# Patient Record
Sex: FEMALE | Race: WHITE | Employment: FULL TIME | URBAN - METROPOLITAN AREA
[De-identification: names, ages, dates, MRNs, and addresses within clinical notes are randomized per-mention and may not be internally consistent; named-entity substitution may affect disease eponyms.]

---

## 2021-02-07 PROBLEM — E21.0 PRIMARY HYPERPARATHYROIDISM (HCC): Status: ACTIVE | Noted: 2021-02-07

## 2021-02-07 PROBLEM — E89.2 STATUS POST PARATHYROIDECTOMY (HCC): Status: ACTIVE | Noted: 2021-02-07

## 2021-02-07 PROBLEM — Z90.89 STATUS POST PARATHYROIDECTOMY: Status: ACTIVE | Noted: 2021-02-07

## 2021-02-07 PROBLEM — E83.51 HYPOCALCEMIA: Status: ACTIVE | Noted: 2021-02-07

## 2021-02-07 PROBLEM — M85.80 OSTEOPENIA: Status: ACTIVE | Noted: 2021-02-07

## 2021-02-07 PROBLEM — Z98.890 STATUS POST PARATHYROIDECTOMY: Status: ACTIVE | Noted: 2021-02-07

## 2021-02-08 ENCOUNTER — OFFICE VISIT (OUTPATIENT)
Dept: ENDOCRINOLOGY | Age: 61
End: 2021-02-08
Payer: COMMERCIAL

## 2021-02-08 VITALS
OXYGEN SATURATION: 100 % | HEIGHT: 68 IN | SYSTOLIC BLOOD PRESSURE: 158 MMHG | HEART RATE: 76 BPM | WEIGHT: 188 LBS | DIASTOLIC BLOOD PRESSURE: 98 MMHG | BODY MASS INDEX: 28.49 KG/M2

## 2021-02-08 DIAGNOSIS — E89.2 POSTSURGICAL HYPOPARATHYROIDISM (HCC): ICD-10-CM

## 2021-02-08 DIAGNOSIS — E04.2 MULTINODULAR GOITER: ICD-10-CM

## 2021-02-08 DIAGNOSIS — M85.80 OSTEOPENIA, UNSPECIFIED LOCATION: ICD-10-CM

## 2021-02-08 DIAGNOSIS — E21.0 PRIMARY HYPERPARATHYROIDISM (HCC): ICD-10-CM

## 2021-02-08 DIAGNOSIS — E83.51 HYPOCALCEMIA: ICD-10-CM

## 2021-02-08 DIAGNOSIS — E89.2 STATUS POST PARATHYROIDECTOMY (HCC): ICD-10-CM

## 2021-02-08 DIAGNOSIS — E66.3 OVERWEIGHT (BMI 25.0-29.9): ICD-10-CM

## 2021-02-08 LAB
A/G RATIO: 1.6 (ref 1.1–2.2)
ALBUMIN SERPL-MCNC: 4.6 G/DL (ref 3.4–5)
ALP BLD-CCNC: 71 U/L (ref 40–129)
ALT SERPL-CCNC: 19 U/L (ref 10–40)
ANION GAP SERPL CALCULATED.3IONS-SCNC: 11 MMOL/L (ref 3–16)
AST SERPL-CCNC: 20 U/L (ref 15–37)
BILIRUB SERPL-MCNC: 0.6 MG/DL (ref 0–1)
BUN BLDV-MCNC: 30 MG/DL (ref 7–20)
CALCIUM SERPL-MCNC: 9.5 MG/DL (ref 8.3–10.6)
CHLORIDE BLD-SCNC: 103 MMOL/L (ref 99–110)
CO2: 25 MMOL/L (ref 21–32)
CREAT SERPL-MCNC: 1.2 MG/DL (ref 0.6–1.2)
GFR AFRICAN AMERICAN: 55
GFR NON-AFRICAN AMERICAN: 45
GLOBULIN: 2.8 G/DL
GLUCOSE BLD-MCNC: 91 MG/DL (ref 70–99)
MAGNESIUM: 2.1 MG/DL (ref 1.8–2.4)
PHOSPHORUS: 3.6 MG/DL (ref 2.5–4.9)
POTASSIUM SERPL-SCNC: 4.3 MMOL/L (ref 3.5–5.1)
SODIUM BLD-SCNC: 139 MMOL/L (ref 136–145)
T3 FREE: 3.2 PG/ML (ref 2.3–4.2)
T4 FREE: 1.2 NG/DL (ref 0.9–1.8)
TOTAL PROTEIN: 7.4 G/DL (ref 6.4–8.2)
TSH SERPL DL<=0.05 MIU/L-ACNC: 2.27 UIU/ML (ref 0.27–4.2)
VITAMIN D 25-HYDROXY: 38.8 NG/ML

## 2021-02-08 PROCEDURE — 99205 OFFICE O/P NEW HI 60 MIN: CPT | Performed by: INTERNAL MEDICINE

## 2021-02-08 RX ORDER — MULTIVITAMIN WITH IRON
250 TABLET ORAL 2 TIMES DAILY
Qty: 60 TABLET | Refills: 0
Start: 2021-02-08 | End: 2021-05-11 | Stop reason: ALTCHOICE

## 2021-02-08 RX ORDER — MELATONIN
1000 DAILY
Qty: 90 TABLET | Refills: 1
Start: 2021-02-08 | End: 2022-06-03

## 2021-02-08 RX ORDER — CALCIUM CARBONATE 200(500)MG
1 TABLET,CHEWABLE ORAL DAILY
COMMUNITY
End: 2021-02-08

## 2021-02-08 RX ORDER — MAGNESIUM 30 MG
30 TABLET ORAL 2 TIMES DAILY
COMMUNITY
End: 2021-05-11

## 2021-02-08 RX ORDER — ATORVASTATIN CALCIUM 40 MG/1
TABLET, FILM COATED ORAL
COMMUNITY
Start: 2020-09-15

## 2021-02-08 RX ORDER — LISINOPRIL 20 MG/1
TABLET ORAL
COMMUNITY
Start: 2020-12-21 | End: 2021-09-24

## 2021-02-08 RX ORDER — LORATADINE 10 MG/1
10 TABLET ORAL DAILY
COMMUNITY

## 2021-02-08 RX ORDER — CALCITRIOL 0.5 UG/1
0.5 CAPSULE, LIQUID FILLED ORAL DAILY
COMMUNITY
Start: 2020-11-10 | End: 2021-05-11

## 2021-02-08 RX ORDER — CALCIUM CARBONATE 200(500)MG
2 TABLET,CHEWABLE ORAL 3 TIMES DAILY
Qty: 200 TABLET | Refills: 1
Start: 2021-02-08 | End: 2021-02-09 | Stop reason: SDUPTHER

## 2021-02-08 NOTE — PROGRESS NOTES
COMPARISON:  None  NOTE:  If there are questions about the content of this report, please contact [facility] radiology by calling [phone]    DXA machine: Webtab (S/N [id number]). [facility] in g/cm2 at 95% confidence: Lumbar spine NA, Total hip NA. FRAX software version: 3.08  COMPARISON machine:  None    TECHNICAL LIMITATIONS/EXCLUSIONS: None   SKELETAL SITES (REGIONS OF INTEREST): Lumbar spine (L1-4) and left hip    FINDINGS:    LUMBAR SPINE:  T-score for the lumbar spine: -1.7  BMD for the lumbar spine in g/cm2: 0.861     HIP:  T-score for the femoral neck: -1.9  BMD for the femoral neck in g/cm2: 0.634   T-score for the total hip: -1.6  BMD for the total hip in g/cm2: 0.742     FRAX:  FRAX risk factor(s): Tobacco use   Major osteoporotic fracture risk (%): 9.7%  Hip fracture risk(%):  2.0%      NOTES:  National Osteoporosis Foundation criteria for using FRAX: untreated postmenopausal women or men 48 or older with T-score between -1.0 and -2.5, no prior hip or vertebral fracture and a DXA evaluable hip. FRAX can be calculated outside of these parameters if desired. The FRAX online calculator is available at  www. jeevan. ac.uk/FRAX/    World Health Organization reference values:  (based on lowest T-score of the lumbar spine, femoral neck, total hip, or 33% radius)  Normal:  T score at or above -1.0   Osteopenia:  T score between -1.0 and -2.5  Osteoporosis:  T score at or below -2.5  Severe osteoporosis: T-score at or below -2.5 and a fragility fracture (prior or new)    Approaches to reduce osteoporosis-related fracture risk include optimizing calcium and vitamin D status and fall-prevention measures.  The National Osteoporosis Foundation treatment guidelines recommend treatment for :  -  Those with hip fracture or vertebral fracture (clinical or asymptomatic)  -  Those with T-score -2.5 or lower at total hip, femoral neck or spine by DXA, after appropriate evaluation  -  Low bone mass and 10-year probability of a hip fracture equal to or greater than 3% or a 10-year probability of a major osteoporotic fracture equal to or greater than 20% (FRAX). All treatment decisions require clinical management and consideration of individual factors including patient preferences, comorbidities, prior drug use, risk factors not captured in FRAX model (e.g.-sarcopenia, vitamin D deficiency, increased bone   turnover, interval significant decline in bone density) and possible under or over estimation of fracture risk by FRAX   Status Results Details              Result Impression     Diffuse small right thyroid nodules. No evidence of enlarged parathyroid nodule. RECOMMENDATION:   No FNA biopsy or imaging followup recommended   Result Narrative   HISTORY: Primary hyperparathyroidism  COMPARISON: None  TECHNIQUE:  Ultrasound images of the thyroid gland  NOTE:  If there are questions about the content of this report, please [place] [facility] [place] by calling [phone]    FINDINGS:    RIGHT LOBE MEASUREMENTS:  5.0 x 1.6 x 1.4 cm (normal <  5 x 1.5 x 1.5 cm)  LEFT LOBE MEASUREMENTS:  4.8 x 1.3 x 1.1 cm (normal <  5 x 1.5 x 1.5 cm)  ADJACENT MASSES OR ENLARGED LYMPH NODES:  None    THYROID GLAND:    One or more subcentimeter thyroid nodule(s) which are smoothly marginated, not taller-than-wide, and with no echogenic foci, deemed to be clinically insignificant. There is a small round solid nodule in right lobe measuring 8 mm x 5 mm x 8 mm. Lesion is   slightly hyperechoic. In the posterior aspect of the lower pole right lobe there is heterogenous nodule measuring approximately 6 cm x 5 mm x 5 mm.     NOTE: Recommendations are based on the TI-RADS 2017 [facility] ultrasound-based risk stratification system to classify thyroid nodules that may warrant biopsy or sonographic follow-up   Other Result Information   Interface, Rad Results In - 06/30/2020  5:02 PM EDT  HISTORY: Primary hyperparathyroidism COMPARISON: None  TECHNIQUE:  Ultrasound images of the thyroid gland  NOTE:  If there are questions about the content of this report, please contact [address] [place] [address] radiology by calling [phone]    FINDINGS:    RIGHT LOBE MEASUREMENTS:  5.0 x 1.6 x 1.4 cm (normal <  5 x 1.5 x 1.5 cm)  LEFT LOBE MEASUREMENTS:  4.8 x 1.3 x 1.1 cm (normal <  5 x 1.5 x 1.5 cm)  ADJACENT MASSES OR ENLARGED LYMPH NODES:  None    THYROID GLAND:    One or more subcentimeter thyroid nodule(s) which are smoothly marginated, not taller-than-wide, and with no echogenic foci, deemed to be clinically insignificant. There is a small round solid nodule in right lobe measuring 8 mm x 5 mm x 8 mm. Lesion is   slightly hyperechoic. In the posterior aspect of the lower pole right lobe there is heterogenous nodule measuring approximately 6 cm x 5 mm x 5 mm. NOTE: Recommendations are based on the TI-RADS 2017 [address] [place] [address] Radiology ultrasound-based risk stratification system to classify thyroid nodules that may warrant biopsy or sonographic follow-up    IMPRESSION    Diffuse small right thyroid nodules. No evidence of enlarged parathyroid nodule. RECOMMENDATION:   No FNA biopsy or imaging followup recommended   Status Results Details            Past Medical History:   Diagnosis Date    Hypertension      Patient Active Problem List    Diagnosis Date Noted    Postsurgical hypoparathyroidism ([address] 02/09/2021    Multinodular goiter 02/08/2021    Overweight (BMI 25.0-29.9) 02/08/2021    Primary hyperparathyroidism ([address] 02/07/2021    Status post parathyroidectomy 02/07/2021    Hypocalcemia 02/07/2021    Osteopenia 02/07/2021     History reviewed. No pertinent surgical history. History reviewed. No pertinent family history.   Social History     Socioeconomic History    Marital status: Single     Spouse name: None    Number of children: None    Years of education: None    Highest education level: None   Occupational History    None Social Needs    Financial resource strain: None    Food insecurity     Worry: None     Inability: None    Transportation needs     Medical: None     Non-medical: None   Tobacco Use    Smoking status: Current Every Day Smoker     Packs/day: 0.10    Smokeless tobacco: Never Used   Substance and Sexual Activity    Alcohol use: Yes     Comment: socially, states 7 tonight    Drug use: No    Sexual activity: None   Lifestyle    Physical activity     Days per week: None     Minutes per session: None    Stress: None   Relationships    Social connections     Talks on phone: None     Gets together: None     Attends Yazdanism service: None     Active member of club or organization: None     Attends meetings of clubs or organizations: None     Relationship status: None    Intimate partner violence     Fear of current or ex partner: None     Emotionally abused: None     Physically abused: None     Forced sexual activity: None   Other Topics Concern    None   Social History Narrative    None     Current Outpatient Medications   Medication Sig Dispense Refill    atorvastatin (LIPITOR) 40 MG tablet TAKE ONE TABLET BY MOUTH DAILY      calcitRIOL (ROCALTROL) 0.5 MCG capsule Take 0.5 mcg by mouth daily      lisinopril (PRINIVIL;ZESTRIL) 20 MG tablet       loratadine (CLARITIN) 10 MG tablet Take 10 mg by mouth daily      magnesium 30 MG tablet Take 30 mg by mouth 2 times daily      vitamin D3 (CHOLECALCIFEROL) 25 MCG (1000 UT) TABS tablet Take 1 tablet by mouth daily 90 tablet 1    Calcium Carbonate Antacid 1000 MG CHEW Take 2 tablets by mouth 3 times daily 60 tablet 0    magnesium (MAGNESIUM-OXIDE) 250 MG TABS tablet Take 1 tablet by mouth 2 times daily 60 tablet 0    lisinopril-hydrochlorothiazide (PRINZIDE;ZESTORETIC) 20-12.5 MG per tablet Take 1 tablet by mouth daily Unsure of dose      LISINOPRIL PO Take  by mouth daily. No current facility-administered medications for this visit.       Allergies Value Date    TSH 2.27 02/08/2021    FT3 3.2 02/08/2021     No results found for: LABA1C  No results found for: EAG  No results found for: CHOL  No results found for: TRIG  No results found for: HDL  No results found for: LDLCHOLESTEROL, LDLCALC  No results found for: LABVLDL, VLDL  No results found for: CHOLHDLRATIO  No results found for: Maggi Gan  Lab Results   Component Value Date    VITD25 38.8 02/08/2021        ASSESSMENT/PLAN:  1. Primary hyperparathyroidism ([address]  Uncontrolled postsurgical hypoparathyroidism  Obtain lab work, then reevaluate  - Vitamin D 25 Hydroxy; Future  - Calcium Ionized Serum; Future  - PTH, Intact; Future  - Phosphorus; Future  - Magnesium; Future  - Comprehensive Metabolic Panel; Future  - Comprehensive Metabolic Panel; Future  - Calcium Ionized Serum; Future  - PTH, Intact; Future  - Phosphorus; Future  - Magnesium; Future  - Vitamin D 25 Hydroxy; Future    2. Status post parathyroidectomy  Parathyroidectomy 9/15/2020  4 gland hyperplasia, gland reimplantation into left sternocleidomastoid muscle on 9/15/2020  - TSH without Reflex; Future  - Comprehensive Metabolic Panel; Future  - Calcium Ionized Serum; Future  - PTH, Intact; Future  - Phosphorus; Future  - Magnesium; Future  - Vitamin D 25 Hydroxy; Future    3. Hypocalcemia/postsurgical hypoparathyroidism  Uncontrolled postsurgical hypoparathyroidism  Continue current treatment, obtain lab work, then make adjustments to her regimen  - calcitRIOL (ROCALTROL) 0.5 MCG capsule; Take 0.5 mcg by mouth daily  - magnesium 30 MG tablet; Take 30 mg by mouth 2 times daily  - vitamin D3 (CHOLECALCIFEROL) 25 MCG (1000 UT) TABS tablet; Take 1 tablet by mouth daily  Dispense: 90 tablet; Refill: 1  - Calcium Carbonate Antacid 1000 MG CHEW; Take 2 tablets by mouth 3 times daily  Dispense: 60 tablet; Refill: 0  - magnesium (MAGNESIUM-OXIDE) 250 MG TABS tablet; Take 1 tablet by mouth 2 times daily  Dispense: 60 tablet;  Refill: 0  - Vitamin D 25 Hydroxy; Future  - Calcium Ionized Serum; Future  - PTH, Intact; Future  - Phosphorus; Future  - Magnesium; Future  - Comprehensive Metabolic Panel; Future  - Comprehensive Metabolic Panel; Future  - Calcium Ionized Serum; Future  - PTH, Intact; Future  - Phosphorus; Future  - Magnesium; Future  - Vitamin D 25 Hydroxy; Future    4. Osteopenia, unspecified location  Last DEXA January 2020  Monitor bone density  - T3, Free; Future  - T4, Free; Future  - TSH without Reflex; Future  - Vitamin D 25 Hydroxy; Future  - Calcium Ionized Serum; Future  - PTH, Intact; Future  - Phosphorus; Future  - Magnesium; Future  - Comprehensive Metabolic Panel; Future  - T3, Free; Future  - T4, Free; Future  - TSH without Reflex; Future  - Comprehensive Metabolic Panel; Future  - Calcium Ionized Serum; Future  - PTH, Intact; Future  - Phosphorus; Future  - Magnesium; Future  - Vitamin D 25 Hydroxy; Future    5. Multinodular goiter  Thyroid sonogram  - T3, Free; Future  - T4, Free; Future  - TSH without Reflex; Future  - T3, Free; Future  - T4, Free; Future  - TSH without Reflex; Future    6. Overweight (BMI 25.0-29. 9)  Diet, exercise      Reviewed and/or ordered clinical lab results Yes  Reviewed and/or ordered radiology tests Yes   Reviewed and/or ordered other diagnostic tests No  Discussed test results with performing physician No  Independently reviewed image, tracing, or specimen No  Made a decision to obtain old records No  Reviewed and summarized old records Yes   Calcium 1110.811.110.710.810.77.9, upper limit normal 10.4  Elevated calcium since 2016  25 hydroxy vitamin D 23.737  PTH 90.781.806.086.73  TSH 2.15  SURGICAL PATHOLOGY REPORT    Name: Mery Pond  EPI#: [id number]  Acct#: [de-identified]    Case #: [id number]    Final Pathologic Diagnosis  A.  Possible left possible superior parathyroid, biopsy:  -     Parathyroid tissue, 0.003 g.     Shital Davidson left inferior parathyroid, excision:  -     Hypercellular parathyroid tissue,0.093 g.  -     Thymic tissue. Weeks Passey right inferior parathyroid gland, excision:  -     Adipose tissue. D.  Possible right superior parathyroid, excision:  -     Hypercellular parathyroid tissue, 0.164 g.    E.  Possible right inferior parathyroid, excision:  -          Fragments of thyroid and parathyroid tissue, 0.091 g. MCS2/MCS2/9/16/2020 11:54:37  Electronically Signed Out            Muna Rosenberg MD  Specimen(s) Received  A.  Possible left possible superior parathyroid  B.  Possible left inferior parathyroid  C.  Possible right inferior parathyroid gland  D.  Possible right superior parathyroid  E.  Possible right inferior parathyroid  Obtained history from other than patient No    Sintia Ortiz was counseled regarding symptoms of primary hyperparathyroidism, postsurgical hypoparathyroidism, thyroid disease, multinodular goiter diagnosis, course and complications of disease if inadequately treated, side effects of medications, diagnosis, treatment options, and prognosis, risks, benefits, complications, and alternatives of treatment, labs, imaging and other studies and treatment targets and goals, signs and symptoms of hyperparathyroidism, treatment, side effects of medications, risk of cancer in thyroid nodules. She understands instructions and counseling. Total time I spent for this encounter 60 minutes    Return in about 2 months (around 4/8/2021) for Hypocalcemia, multinodular goiter.     Electronically signed by Annabella Olivo MD on 2/9/2021 at 12:40 AM

## 2021-02-09 PROBLEM — E89.2 POSTSURGICAL HYPOPARATHYROIDISM (HCC): Status: ACTIVE | Noted: 2021-02-09

## 2021-02-09 LAB — PARATHYROID HORMONE INTACT: <1.2 PG/ML (ref 14–72)

## 2021-02-11 LAB
CALCIUM IONIZED, CALC AT PH 7.4: 1.22 MMOL/L (ref 1.11–1.3)
CALCIUM IONIZED: 1.25 MMOL/L (ref 1.11–1.3)

## 2021-04-06 ENCOUNTER — TELEPHONE (OUTPATIENT)
Dept: ENDOCRINOLOGY | Age: 61
End: 2021-04-06

## 2021-04-06 NOTE — TELEPHONE ENCOUNTER
Please inform patient that February test results were good overall regarding calcium, vitamin D, magnesium, phosphorus. Parathyroid hormone was low, which is related to her surgery. I advise to continue same medications and supplements doses. Advised to keep an appointment in May to discuss further plan regarding follow-up and go over if she has questions.

## 2021-05-11 ENCOUNTER — OFFICE VISIT (OUTPATIENT)
Dept: ENDOCRINOLOGY | Age: 61
End: 2021-05-11
Payer: COMMERCIAL

## 2021-05-11 VITALS
BODY MASS INDEX: 28.13 KG/M2 | OXYGEN SATURATION: 97 % | SYSTOLIC BLOOD PRESSURE: 147 MMHG | TEMPERATURE: 97.8 F | RESPIRATION RATE: 14 BRPM | DIASTOLIC BLOOD PRESSURE: 93 MMHG | WEIGHT: 185.6 LBS | HEIGHT: 68 IN | HEART RATE: 73 BPM

## 2021-05-11 DIAGNOSIS — E66.3 OVERWEIGHT (BMI 25.0-29.9): ICD-10-CM

## 2021-05-11 DIAGNOSIS — M85.80 OSTEOPENIA, UNSPECIFIED LOCATION: ICD-10-CM

## 2021-05-11 DIAGNOSIS — E89.2 STATUS POST PARATHYROIDECTOMY (HCC): ICD-10-CM

## 2021-05-11 DIAGNOSIS — E83.51 HYPOCALCEMIA: ICD-10-CM

## 2021-05-11 DIAGNOSIS — E89.2 POSTSURGICAL HYPOPARATHYROIDISM (HCC): ICD-10-CM

## 2021-05-11 DIAGNOSIS — E04.2 MULTINODULAR GOITER: ICD-10-CM

## 2021-05-11 DIAGNOSIS — E21.0 PRIMARY HYPERPARATHYROIDISM (HCC): Primary | ICD-10-CM

## 2021-05-11 PROCEDURE — 99215 OFFICE O/P EST HI 40 MIN: CPT | Performed by: INTERNAL MEDICINE

## 2021-05-11 RX ORDER — CALCITRIOL 0.5 UG/1
0.5 CAPSULE, LIQUID FILLED ORAL DAILY
Qty: 30 CAPSULE | Refills: 3 | Status: SHIPPED
Start: 2021-05-11 | End: 2021-08-10 | Stop reason: DRUGHIGH

## 2021-05-11 RX ORDER — CALCIUM CARBONATE 300MG(750)
TABLET,CHEWABLE ORAL
Qty: 90 TABLET | Refills: 0
Start: 2021-05-11 | End: 2021-09-24

## 2021-05-11 NOTE — PROGRESS NOTES
10-year probability of a major osteoporotic fracture equal to or greater than 20% (FRAX). All treatment decisions require clinical management and consideration of individual factors including patient preferences, comorbidities, prior drug use, risk factors not captured in FRAX model (e.g.-sarcopenia, vitamin D deficiency, increased bone   turnover, interval significant decline in bone density) and possible under or over estimation of fracture risk by FRAX   Status Results Details              Result Impression     Diffuse small right thyroid nodules. No evidence of enlarged parathyroid nodule. RECOMMENDATION:   No FNA biopsy or imaging followup recommended   Result Narrative   HISTORY: Primary hyperparathyroidism  COMPARISON: None  TECHNIQUE:  Ultrasound images of the thyroid gland  NOTE:  If there are questions about the content of this report, please contact [address] [place] [address] by calling [phone]    FINDINGS:    RIGHT LOBE MEASUREMENTS:  5.0 x 1.6 x 1.4 cm (normal <  5 x 1.5 x 1.5 cm)  LEFT LOBE MEASUREMENTS:  4.8 x 1.3 x 1.1 cm (normal <  5 x 1.5 x 1.5 cm)  ADJACENT MASSES OR ENLARGED LYMPH NODES:  None    THYROID GLAND:    One or more subcentimeter thyroid nodule(s) which are smoothly marginated, not taller-than-wide, and with no echogenic foci, deemed to be clinically insignificant. There is a small round solid nodule in right lobe measuring 8 mm x 5 mm x 8 mm. Lesion is   slightly hyperechoic. In the posterior aspect of the lower pole right lobe there is heterogenous nodule measuring approximately 6 cm x 5 mm x 5 mm.     NOTE: Recommendations are based on the TI-RADS 2017 [address] [place] [address] of Radiology ultrasound-based risk stratification system to classify thyroid nodules that may warrant biopsy or sonographic follow-up   Other Result Information   Interface, Rad Results In - 06/30/2020  5:02 PM EDT  HISTORY: Primary hyperparathyroidism  COMPARISON: None  TECHNIQUE:  Ultrasound images of the thyroid gland  NOTE:  If there are questions about the content of this report, please contact [address] [place] [address] radiology by calling [phone]    FINDINGS:    RIGHT LOBE MEASUREMENTS:  5.0 x 1.6 x 1.4 cm (normal <  5 x 1.5 x 1.5 cm)  LEFT LOBE MEASUREMENTS:  4.8 x 1.3 x 1.1 cm (normal <  5 x 1.5 x 1.5 cm)  ADJACENT MASSES OR ENLARGED LYMPH NODES:  None    THYROID GLAND:    One or more subcentimeter thyroid nodule(s) which are smoothly marginated, not taller-than-wide, and with no echogenic foci, deemed to be clinically insignificant. There is a small round solid nodule in right lobe measuring 8 mm x 5 mm x 8 mm. Lesion is   slightly hyperechoic. In the posterior aspect of the lower pole right lobe there is heterogenous nodule measuring approximately 6 cm x 5 mm x 5 mm. NOTE: Recommendations are based on the TI-RADS 2017 [address] [place] [address] Radiology ultrasound-based risk stratification system to classify thyroid nodules that may warrant biopsy or sonographic follow-up    IMPRESSION    Diffuse small right thyroid nodules. No evidence of enlarged parathyroid nodule. RECOMMENDATION:   No FNA biopsy or imaging followup recommended   Status Results Details            Past Medical History:   Diagnosis Date    Hypertension      Patient Active Problem List    Diagnosis Date Noted    Postsurgical hypoparathyroidism ([address] 02/09/2021    Multinodular goiter 02/08/2021    Overweight (BMI 25.0-29.9) 02/08/2021    Primary hyperparathyroidism ([address] 02/07/2021    Status post parathyroidectomy 02/07/2021    Hypocalcemia 02/07/2021    Osteopenia 02/07/2021     History reviewed. No pertinent surgical history. History reviewed. No pertinent family history.   Social History     Socioeconomic History    Marital status: Single     Spouse name: None    Number of children: None    Years of education: None    Highest education level: None   Occupational History    None   Tobacco Use    Smoking status: Current Every Day Smoker Packs/day: 0.25     Years: 15.00     Pack years: 3.75     Types: Cigarettes    Smokeless tobacco: Never Used   Substance and Sexual Activity    Alcohol use: Yes     Comment: socially, states 7 tonight    Drug use: No    Sexual activity: None   Other Topics Concern    None   Social History Narrative    None     Social Determinants of Health     Financial Resource Strain:     Difficulty of Paying Living Expenses:    Food Insecurity:     Worried About Running Out of Food in the Last Year:     Ran Out of Food in the Last Year:    Transportation Needs:     Lack of Transportation (Medical):  Lack of Transportation (Non-Medical):    Physical Activity:     Days of Exercise per Week:     Minutes of Exercise per Session:    Stress:     Feeling of Stress :    Social Connections:     Frequency of Communication with Friends and Family:     Frequency of Social Gatherings with Friends and Family:     Attends [employer] Services:     Active Member of Clubs or Organizations:     Attends Club or Organization Meetings:     Marital Status:    Intimate Partner Violence:     Fear of Current or Ex-Partner:     Emotionally Abused:     Physically Abused:     Sexually Abused:      Current Outpatient Medications   Medication Sig Dispense Refill    Magnesium 400 MG TABS 1 tablet daily 90 tablet 0    calcitRIOL (ROCALTROL) 0.5 MCG capsule Take 1 capsule by mouth daily 30 capsule 3    atorvastatin (LIPITOR) 40 MG tablet TAKE ONE TABLET BY MOUTH DAILY      lisinopril (PRINIVIL;ZESTRIL) 20 MG tablet       loratadine (CLARITIN) 10 MG tablet Take 10 mg by mouth daily      vitamin D3 (CHOLECALCIFEROL) 25 MCG (1000 UT) TABS tablet Take 1 tablet by mouth daily 90 tablet 1    Calcium Carbonate Antacid 1000 MG CHEW Take 2 tablets by mouth 3 times daily 60 tablet 0     No current facility-administered medications for this visit.      Allergies   Allergen Reactions    Methylene Blue Other (See Comments)     Pain in extremity of infusion \"throbbing, and it feels like a tourniquet is on my arm\"    Pcn [Penicillins] Hives     No family status information on file.        Review of Systems:  Constitutional: no fatigue, no fever, no recent weight gain, no recent weight loss, no changes in appetite  Eyes: no eye pain, has change in vision, no eye redness, no eye irritation, no double vision  Ears, nose, throat: has nasal congestion, no sore throat, no earache, no decrease in hearing, no hoarseness, no dry mouth, has sinus problems, no difficulty swallowing, no neck lumps, no dental problems, no mouth sores, no ringing in ears  Pulmonary: no shortness of breath, no wheezing, no dyspnea on exertion, no cough  Cardiovascular: no chest pain, no lower extremity edema, no orthopnea, no intermittent leg claudication, has palpitations  Gastrointestinal: no abdominal pain, no nausea, no vomiting, no diarrhea, has constipation, no dysphagia, has heartburn, no bloating  Genitourinary: no dysuria, no urinary incontinence, no urinary hesitancy, no urinary frequency, no feelings of urinary urgency, no nocturia  Musculoskeletal: no joint swelling, no joint stiffness, has joint pain, has muscle cramps, has muscle pain, no bone pain  Integument/Breast: no hair loss, no skin rashes, no skin lesions, no itching, no dry skin  Neurological: has numbness, has tingling, no weakness, no confusion, has headaches, has dizziness, no fainting, no tremors, no decrease in memory, no balance problems  Psychiatric: no anxiety, no depression, no insomnia  Hematologic/Lymphatic: no tendency for easy bleeding, no swollen lymph nodes, no tendency for easy bruising  Immunology: has seasonal allergies, no frequent infections, no frequent illnesses  Endocrine: no temperature intolerance    BP (!) 147/93   Pulse 73   Temp 97.8 °F (36.6 °C)   Resp 14   Ht 5' 8\" (1.727 m)   Wt 185 lb 9.6 oz (84.2 kg)   LMP 07/07/2012   SpO2 97%   BMI 28.22 kg/m²    Wt Readings from Last 3 Encounters:   05/11/21 185 lb 9.6 oz (84.2 kg)   02/08/21 188 lb (85.3 kg)     Body mass index is 28.22 kg/m².     OBJECTIVE:  Constitutional: no acute distress, well appearing and well nourished  Psychiatric: oriented to person, place and time, judgement and insight and normal, recent and remote memory and intact and mood and affect are normal  Skin: skin and subcutaneous tissue is normal without mass, normal turgor  Head and Face: examination of head and face revealed no abnormalities  Eyes: no lid or conjunctival swelling, erythema or discharge, pupils are normal, equal, round, reactive to light  Ears/Nose: external inspection of ears and nose revealed no abnormalities, hearing is grossly normal  Oropharynx/Mouth/Face: lips, tongue and gums are normal with no lesions, the voice quality was normal  Neck: neck is supple and symmetric, with midline trachea and no masses, thyroid is enlarged  Lymphatics: normal cervical lymph nodes, normal supraclavicular nodes  Pulmonary: no increased work of breathing or signs of respiratory distress, lungs are clear to auscultation  Cardiovascular: normal heart rate and rhythm, normal S1 and S2, no murmurs and pedal pulses and 2+ bilaterally, No edema  Abdomen: abdomen is soft, non-tender with no masses  Musculoskeletal: normal gait and station and exam of the digits and nails are normal  Neurological: normal coordination and normal general cortical function      Lab Review:    Lab Results   Component Value Date    WBC 5.4 09/21/2015    HGB 12.6 09/21/2015    HCT 37.6 09/21/2015    MCV 92.9 09/21/2015     09/21/2015     Lab Results   Component Value Date     02/08/2021    K 4.3 02/08/2021     02/08/2021    CO2 25 02/08/2021    BUN 30 02/08/2021    CREATININE 1.2 02/08/2021    GLUCOSE 91 02/08/2021    CALCIUM 9.5 02/08/2021    PROT 7.4 02/08/2021    LABALBU 4.6 02/08/2021    BILITOT 0.6 02/08/2021    ALKPHOS 71 02/08/2021    AST 20 02/08/2021    ALT 19 the low bone density range, meeting WHO criteria for osteopenia. INTERVAL CHANGE: Not applicable   COMPARISON:  None   DXA machine: [employer] W (S/N [id number]). [address] in g/cm2 at 95% confidence: Lumbar spine NA, Total hip NA. FRAX software version: 3.08   LUMBAR SPINE:   T-score for the lumbar spine: -1.7   BMD for the lumbar spine in g/cm2: 0.861   HIP:   T-score for the femoral neck: -1.9   BMD for the femoral neck in g/cm2: 0.634   T-score for the total hip: -1.6   BMD for the total hip in g/cm2: 0.742     FRAX:   FRAX risk factor(s): Tobacco use   Major osteoporotic fracture risk (%): 9.7%   Hip fracture risk(%):  2.0%        - Comprehensive Metabolic Panel; Future  - Vitamin D 25 Hydroxy; Future    5. Multinodular goiter  Small thyroid nodules  Continue monitoring  TSH 2.27  Thyroid sonogram  - T3, Free; Future  - T4, Free; Future  - TSH without Reflex; Future    6. Overweight (BMI 25.0-29. 9)  Diet, exercise    Decreased GFR  Feet cramps  See PCP    Reviewed and/or ordered clinical lab results Yes  Reviewed and/or ordered radiology tests Yes   Reviewed and/or ordered other diagnostic tests No  Discussed test results with performing physician No  Independently reviewed image, tracing, or specimen No  Made a decision to obtain old records No  Reviewed and summarized old records Yes   Calcium 1110.811.110.710.810.77.9, upper limit normal 10.4  Elevated calcium since 2016  25 hydroxy vitamin D 23.737  PTH 90.781.806.086.73  TSH 2.15  SURGICAL PATHOLOGY REPORT    Name: Mady Rodarte  EPI#: [id number]  Acct#: [de-identified]    Case #: [id number]    Final Pathologic Diagnosis  A.  Possible left possible superior parathyroid, biopsy:  -     Parathyroid tissue, 0.003 g. Lewiscpari Rosannerafael left inferior parathyroid, excision:  -     Hypercellular parathyroid tissue,0.093 g.  -     Thymic tissue. Corin Wilks right inferior parathyroid gland, excision:  -     Adipose tissue.     D.  Possible right superior

## 2021-08-10 ENCOUNTER — OFFICE VISIT (OUTPATIENT)
Dept: ENDOCRINOLOGY | Age: 61
End: 2021-08-10
Payer: COMMERCIAL

## 2021-08-10 VITALS
WEIGHT: 180.6 LBS | OXYGEN SATURATION: 97 % | HEART RATE: 71 BPM | SYSTOLIC BLOOD PRESSURE: 132 MMHG | HEIGHT: 67 IN | BODY MASS INDEX: 28.35 KG/M2 | DIASTOLIC BLOOD PRESSURE: 82 MMHG

## 2021-08-10 DIAGNOSIS — E83.51 HYPOCALCEMIA: ICD-10-CM

## 2021-08-10 DIAGNOSIS — M85.80 OSTEOPENIA, UNSPECIFIED LOCATION: ICD-10-CM

## 2021-08-10 DIAGNOSIS — E21.0 PRIMARY HYPERPARATHYROIDISM (HCC): Primary | ICD-10-CM

## 2021-08-10 DIAGNOSIS — E66.3 OVERWEIGHT (BMI 25.0-29.9): ICD-10-CM

## 2021-08-10 DIAGNOSIS — E89.2 STATUS POST PARATHYROIDECTOMY (HCC): ICD-10-CM

## 2021-08-10 DIAGNOSIS — E04.2 MULTINODULAR GOITER: ICD-10-CM

## 2021-08-10 DIAGNOSIS — E89.2 POSTSURGICAL HYPOPARATHYROIDISM (HCC): ICD-10-CM

## 2021-08-10 DIAGNOSIS — N18.31 STAGE 3A CHRONIC KIDNEY DISEASE (HCC): ICD-10-CM

## 2021-08-10 PROCEDURE — 99215 OFFICE O/P EST HI 40 MIN: CPT | Performed by: INTERNAL MEDICINE

## 2021-08-10 RX ORDER — CALCITRIOL 0.25 UG/1
0.25 CAPSULE, LIQUID FILLED ORAL DAILY
Qty: 30 CAPSULE | Refills: 3 | Status: SHIPPED | OUTPATIENT
Start: 2021-08-10 | End: 2021-12-09

## 2021-08-10 NOTE — PROGRESS NOTES
osteoporosis  COMPARISON:  None  NOTE:  If there are questions about the content of this report, please contact [address] [place] [address] radiology by calling [phone]    DXA machine: [employer] (S/N [id number]). 100 [facility] in g/cm2 at 95% confidence: Lumbar spine NA, Total hip NA. FRAX software version: 3.08  COMPARISON machine:  None    TECHNICAL LIMITATIONS/EXCLUSIONS: None   SKELETAL SITES (REGIONS OF INTEREST): Lumbar spine (L1-4) and left hip    FINDINGS:    LUMBAR SPINE:  T-score for the lumbar spine: -1.7  BMD for the lumbar spine in g/cm2: 0.861     HIP:  T-score for the femoral neck: -1.9  BMD for the femoral neck in g/cm2: 0.634   T-score for the total hip: -1.6  BMD for the total hip in g/cm2: 0.742     FRAX:  FRAX risk factor(s): Tobacco use   Major osteoporotic fracture risk (%): 9.7%  Hip fracture risk(%):  2.0%      NOTES:  National Osteoporosis Foundation criteria for using FRAX: untreated postmenopausal women or men 48 or older with T-score between -1.0 and -2.5, no prior hip or vertebral fracture and a DXA evaluable hip. FRAX can be calculated outside of these parameters if desired. The FRAX online calculator is available at  [email or web address] jeevan. ac.uk/FRAX/    World Health Organization reference values:  (based on lowest T-score of the lumbar spine, femoral neck, total hip, or 33% radius)  Normal:  T score at or above -1.0   Osteopenia:  T score between -1.0 and -2.5  Osteoporosis:  T score at or below -2.5  Severe osteoporosis: T-score at or below -2.5 and a fragility fracture (prior or new)    Approaches to reduce osteoporosis-related fracture risk include optimizing calcium and vitamin D status and fall-prevention measures.  The National Osteoporosis Foundation treatment guidelines recommend treatment for :  -  Those with hip fracture or vertebral fracture (clinical or asymptomatic)  -  Those with T-score -2.5 or lower at total hip, femoral neck or spine by DXA, after appropriate evaluation  -  Low bone mass and 10-year probability of a hip fracture equal to or greater than 3% or a 10-year probability of a major osteoporotic fracture equal to or greater than 20% (FRAX). All treatment decisions require clinical management and consideration of individual factors including patient preferences, comorbidities, prior drug use, risk factors not captured in FRAX model (e.g.-sarcopenia, vitamin D deficiency, increased bone   turnover, interval significant decline in bone density) and possible under or over estimation of fracture risk by FRAX   Status Results Details              Result Impression     Diffuse small right thyroid nodules. No evidence of enlarged parathyroid nodule. RECOMMENDATION:   No FNA biopsy or imaging followup recommended   Result Narrative   HISTORY: Primary hyperparathyroidism  COMPARISON: None  TECHNIQUE:  Ultrasound images of the thyroid gland  NOTE:  If there are questions about the content of this report, please contact [address] [place] [address] by calling [phone]    FINDINGS:    RIGHT LOBE MEASUREMENTS:  5.0 x 1.6 x 1.4 cm (normal <  5 x 1.5 x 1.5 cm)  LEFT LOBE MEASUREMENTS:  4.8 x 1.3 x 1.1 cm (normal <  5 x 1.5 x 1.5 cm)  ADJACENT MASSES OR ENLARGED LYMPH NODES:  None    THYROID GLAND:    One or more subcentimeter thyroid nodule(s) which are smoothly marginated, not taller-than-wide, and with no echogenic foci, deemed to be clinically insignificant. There is a small round solid nodule in right lobe measuring 8 mm x 5 mm x 8 mm. Lesion is   slightly hyperechoic. In the posterior aspect of the lower pole right lobe there is heterogenous nodule measuring approximately 6 cm x 5 mm x 5 mm.     NOTE: Recommendations are based on the TI-RADS 2017 [address] [place] [address] of Radiology ultrasound-based risk stratification system to classify thyroid nodules that may warrant biopsy or sonographic follow-up   Other Result Information   Interface, Rad Results In - 06/30/2020  5:02 PM EDT  HISTORY: Primary hyperparathyroidism  COMPARISON: None  TECHNIQUE:  Ultrasound images of the thyroid gland  NOTE:  If there are questions about the content of this report, please contact [facility] radiology by calling [phone]    FINDINGS:    RIGHT LOBE MEASUREMENTS:  5.0 x 1.6 x 1.4 cm (normal <  5 x 1.5 x 1.5 cm)  LEFT LOBE MEASUREMENTS:  4.8 x 1.3 x 1.1 cm (normal <  5 x 1.5 x 1.5 cm)  ADJACENT MASSES OR ENLARGED LYMPH NODES:  None    THYROID GLAND:    One or more subcentimeter thyroid nodule(s) which are smoothly marginated, not taller-than-wide, and with no echogenic foci, deemed to be clinically insignificant. There is a small round solid nodule in right lobe measuring 8 mm x 5 mm x 8 mm. Lesion is   slightly hyperechoic. In the posterior aspect of the lower pole right lobe there is heterogenous nodule measuring approximately 6 cm x 5 mm x 5 mm. NOTE: Recommendations are based on the TI-RADS 2017 [employer] of Radiology ultrasound-based risk stratification system to classify thyroid nodules that may warrant biopsy or sonographic follow-up    IMPRESSION    Diffuse small right thyroid nodules. No evidence of enlarged parathyroid nodule. RECOMMENDATION:   No FNA biopsy or imaging followup recommended   Status Results Details            Past Medical History:   Diagnosis Date    Hypertension      Patient Active Problem List    Diagnosis Date Noted    Postsurgical hypoparathyroidism ([address] 02/09/2021    Multinodular goiter 02/08/2021    Overweight (BMI 25.0-29.9) 02/08/2021    Primary hyperparathyroidism ([address] 02/07/2021    Status post parathyroidectomy ([address] 02/07/2021    Hypocalcemia 02/07/2021    Osteopenia 02/07/2021     History reviewed. No pertinent surgical history. History reviewed. No pertinent family history.   Social History     Socioeconomic History    Marital status: Single     Spouse name: None    Number of children: None    Years of education: None    Highest education level: None Occupational History    None   Tobacco Use    Smoking status: Current Every Day Smoker     Packs/day: 1.00     Years: 15.00     Pack years: 15.00     Types: Cigarettes    Smokeless tobacco: Never Used    Tobacco comment: some days smoking up to 2 packs    Vaping Use    Vaping Use: Never used   Substance and Sexual Activity    Alcohol use: Yes     Comment: socially    Drug use: Not Currently     Types: Marijuana    Sexual activity: None   Other Topics Concern    None   Social History Narrative    None     Social Determinants of Health     Financial Resource Strain:     Difficulty of Paying Living Expenses:    Food Insecurity:     Worried About Running Out of Food in the Last Year:     Ran Out of Food in the Last Year:    Transportation Needs:     Lack of Transportation (Medical):      Lack of Transportation (Non-Medical):    Physical Activity:     Days of Exercise per Week:     Minutes of Exercise per Session:    Stress:     Feeling of Stress :    Social Connections:     Frequency of Communication with Friends and Family:     Frequency of Social Gatherings with Friends and Family:     Attends L[employer] Services:     Active Member of Clubs or Organizations:     Attends Club or Organization Meetings:     Marital Status:    Intimate Partner Violence:     Fear of Current or Ex-Partner:     Emotionally Abused:     Physically Abused:     Sexually Abused:      Current Outpatient Medications   Medication Sig Dispense Refill    calcitRIOL (ROCALTROL) 0.25 MCG capsule Take 1 capsule by mouth daily 30 capsule 3    Magnesium 400 MG TABS 1 tablet daily 90 tablet 0    atorvastatin (LIPITOR) 40 MG tablet TAKE ONE TABLET BY MOUTH DAILY      lisinopril (PRINIVIL;ZESTRIL) 20 MG tablet       loratadine (CLARITIN) 10 MG tablet Take 10 mg by mouth daily      vitamin D3 (CHOLECALCIFEROL) 25 MCG (1000 UT) TABS tablet Take 1 tablet by mouth daily 90 tablet 1    Calcium Carbonate Antacid 1000 MG CHEW Take intolerance    /82 (Site: Right Upper Arm, Position: Sitting, Cuff Size: Large Adult)   Pulse 71   Ht 5' 7\" (1.702 m)   Wt 180 lb 9.6 oz (81.9 kg)   LMP 07/07/2012   SpO2 97%   BMI 28.29 kg/m²    Wt Readings from Last 3 Encounters:   08/10/21 180 lb 9.6 oz (81.9 kg)   05/11/21 185 lb 9.6 oz (84.2 kg)   02/08/21 188 lb (85.3 kg)     Body mass index is 28.29 kg/m².     OBJECTIVE:  Constitutional: no acute distress, well appearing and well nourished  Psychiatric: oriented to person, place and time, judgement and insight and normal, recent and remote memory and intact and mood and affect are normal  Skin: skin and subcutaneous tissue is normal without mass, normal turgor  Head and Face: examination of head and face revealed no abnormalities  Eyes: no lid or conjunctival swelling, erythema or discharge, pupils are normal, equal, round, reactive to light  Ears/Nose: external inspection of ears and nose revealed no abnormalities, hearing is grossly normal  Oropharynx/Mouth/Face: lips, tongue and gums are normal with no lesions, the voice quality was normal  Neck: neck is supple and symmetric, with midline trachea and no masses, thyroid is enlarged  Lymphatics: normal cervical lymph nodes, normal supraclavicular nodes  Pulmonary: no increased work of breathing or signs of respiratory distress, lungs are clear to auscultation  Cardiovascular: normal heart rate and rhythm, normal S1 and S2, no murmurs and pedal pulses and 2+ bilaterally, No edema  Abdomen: abdomen is soft, non-tender with no masses  Musculoskeletal: normal gait and station and exam of the digits and nails are normal  Neurological: normal coordination and normal general cortical function      Lab Review:    Lab Results   Component Value Date    WBC 5.4 09/21/2015    HGB 12.6 09/21/2015    HCT 37.6 09/21/2015    MCV 92.9 09/21/2015     09/21/2015     Lab Results   Component Value Date     08/06/2021    K 4.3 08/06/2021     08/06/2021    CO2 26 08/06/2021    BUN 29 08/06/2021    CREATININE 1.35 08/06/2021    GLUCOSE 101 08/06/2021    CALCIUM 11.0 08/06/2021    PROT 7.5 08/06/2021    LABALBU 4.8 08/06/2021    BILITOT 0.6 08/06/2021    ALKPHOS 76 08/06/2021    AST 23 08/06/2021    ALT 19 08/06/2021    LABGLOM 42 08/06/2021    GFRAA 49 08/06/2021    AGRATIO 1.8 08/06/2021    GLOB 2.7 08/06/2021     Lab Results   Component Value Date    TSH 1.910 08/06/2021    FT3 2.7 08/06/2021     No results found for: LABA1C  No results found for: EAG  No results found for: CHOL  No results found for: TRIG  No results found for: HDL  No results found for: LDLCHOLESTEROL, LDLCALC  No results found for: LABVLDL, VLDL  No results found for: CHOLHDLRATIO  No results found for: Donnice Greeley  Lab Results   Component Value Date    VITD25 37.7 08/06/2021        ASSESSMENT/PLAN:  1. Primary hyperparathyroidism ([address]  Uncontrolled postsurgical hypoparathyroidism  Parathyroidectomy 9/15/2020  - Comprehensive Metabolic Panel; Future  - Calcium Ionized Serum; Future  - PTH, Intact; Future  - Phosphorus; Future  - Magnesium; Future  - Vitamin D 25 Hydroxy; Future    2. Status post parathyroidectomy  Parathyroidectomy 9/15/2020  4 gland hyperplasia, gland reimplantation into left sternocleidomastoid muscle on 9/15/2020  - Comprehensive Metabolic Panel; Future  - Calcium Ionized Serum; Future  - PTH, Intact; Future  - Phosphorus; Future  - Magnesium; Future  - Vitamin D 25 Hydroxy; Future    3. Hypocalcemia/postsurgical hypoparathyroidism  Call with results in 2 weeks  Uncontrolled  PTH <1.27  Calcium 9.511.0  25OHvitamin D 38.837.7  Uncontrolled postsurgical hypoparathyroidism  Decrease Calcitriol to 0.25 mcg daily   Continue close monitoring  - calcitRIOL (ROCALTROL) 0.25 MCG capsule; Take 0.25 mcg by mouth daily  - magnesium 400 mg daily  - vitamin D3 (CHOLECALCIFEROL) 25 MCG (1000 UT) TABS tablet;  Take 1 tablet by mouth daily   - Calcium Carbonate Antacid 1000 MG CHEW; Take 2 tablets by mouth 3 times daily   - Vitamin D 25 Hydroxy; Future  - Calcium Ionized Serum; Future  - PTH, Intact; Future  - Phosphorus; Future  - Magnesium; Future  - Comprehensive Metabolic Panel; Future    4. Osteopenia, unspecified location  Last DEXA January 2020  Monitor bone density  OSTEOPENIA. T-scores are in the low bone density range, meeting WHO criteria for osteopenia. INTERVAL CHANGE: Not applicable   COMPARISON:  None   DXA machine: [employer] (S/N [id number]). 100 [facility] in g/cm2 at 95% confidence: Lumbar spine NA, Total hip NA. FRAX software version: 3.08   LUMBAR SPINE:   T-score for the lumbar spine: -1.7   BMD for the lumbar spine in g/cm2: 0.861   HIP:   T-score for the femoral neck: -1.9   BMD for the femoral neck in g/cm2: 0.634   T-score for the total hip: -1.6   BMD for the total hip in g/cm2: 0.742     FRAX:   FRAX risk factor(s): Tobacco use   Major osteoporotic fracture risk (%): 9.7%   Hip fracture risk(%):  2.0%        - Comprehensive Metabolic Panel; Future  - Vitamin D 25 Hydroxy; Future    5. Multinodular goiter  Small thyroid nodules  Continue monitoring  TSH 2.271.9  Thyroid sonogram  - T3, Free; Future  - T4, Free; Future  - TSH without Reflex; Future    6. Overweight (BMI 25.0-29. 9)  Diet, exercise    7.   Chronic kidney disease stage 3  Creatinine 1.35  Decreased GFR 42  Feet cramps  Refer to Nephrologist    Reviewed and/or ordered clinical lab results Yes  Reviewed and/or ordered radiology tests Yes   Reviewed and/or ordered other diagnostic tests No  Discussed test results with performing physician No  Independently reviewed image, tracing, or specimen No  Made a decision to obtain old records No  Reviewed and summarized old records Yes   Calcium 1110.811.110.710.810.77.9, upper limit normal 10.4  Elevated calcium since 2016  25 hydroxy vitamin D 23.737  PTH 90.781.806.086.73  TSH 2.15  SURGICAL PATHOLOGY REPORT    Name: Calderon Mckoy  EPI#: [id number]  [facility]#: [de-identified]    Case #: [id number]    Final Pathologic Diagnosis  A.  Possible left possible superior parathyroid, biopsy:  -     Parathyroid tissue, 0.003 g. Dukes Gross left inferior parathyroid, excision:  -     Hypercellular parathyroid tissue,0.093 g.  -     Thymic tissue. Gabriela Gross right inferior parathyroid gland, excision:  -     Adipose tissue. D.  Possible right superior parathyroid, excision:  -     Hypercellular parathyroid tissue, 0.164 g.    E.  Possible right inferior parathyroid, excision:  -          Fragments of thyroid and parathyroid tissue, 0.091 g. MCS2/MCS2/9/16/2020 11:54:37  Electronically Signed Out            Tavo Cota MD  Specimen(s) Received  A.  Possible left possible superior parathyroid  B.  Possible left inferior parathyroid  C.  Possible right inferior parathyroid gland  D.  Possible right superior parathyroid  E.  Possible right inferior parathyroid    Obtained history from other than patient Kellen Galan was counseled regarding symptoms of primary hyperparathyroidism, postsurgical hypoparathyroidism, thyroid disease, multinodular goiter diagnosis, course and complications of disease if inadequately treated, side effects of medications, diagnosis, treatment options, and prognosis, risks, benefits, complications, and alternatives of treatment, labs, imaging and other studies and treatment targets and goals, signs and symptoms of hyperparathyroidism, treatment, side effects of medications, risk of cancer in thyroid nodules. She understands instructions and counseling. Total time I spent for this encounter 40 minutes    Return in about 2 months (around 10/10/2021) for thyroid problems, hypercalcemia.     Electronically signed by Parul Boudreaux MD on 8/10/2021 at 3:29 PM

## 2021-09-02 ENCOUNTER — TELEPHONE (OUTPATIENT)
Dept: ENDOCRINOLOGY | Age: 61
End: 2021-09-02

## 2021-09-03 NOTE — TELEPHONE ENCOUNTER
I spoke with the patient. Discussed results. Will attempt to decrease calcium to 4 tablets daily, later to 3 tablets daily if no symptoms. She is seeing a kidney specialist at the end of the month, will await their lab results and discuss calcium test again.

## 2021-10-15 ENCOUNTER — HOSPITAL ENCOUNTER (OUTPATIENT)
Age: 61
Discharge: HOME | End: 2021-10-15
Payer: COMMERCIAL

## 2021-10-15 DIAGNOSIS — N17.9 AKI (ACUTE KIDNEY INJURY) (HCC): ICD-10-CM

## 2021-10-15 LAB
A/G RATIO: 1.6 (ref 1.1–2.2)
ALBUMIN SERPL-MCNC: 4.7 G/DL (ref 3.4–5)
ALP BLD-CCNC: 71 U/L (ref 40–129)
ALT SERPL-CCNC: 19 U/L (ref 10–40)
ANION GAP SERPL CALCULATED.3IONS-SCNC: 14 MMOL/L (ref 3–16)
AST SERPL-CCNC: 20 U/L (ref 15–37)
BACTERIA: ABNORMAL /HPF
BILIRUB SERPL-MCNC: 0.6 MG/DL (ref 0–1)
BILIRUBIN URINE: NEGATIVE
BLOOD, URINE: ABNORMAL
BUN BLDV-MCNC: 18 MG/DL (ref 7–20)
C3 COMPLEMENT: 136.3 MG/DL (ref 90–180)
C4 COMPLEMENT: 32.1 MG/DL (ref 10–40)
CALCIUM SERPL-MCNC: 9.7 MG/DL (ref 8.3–10.6)
CHLORIDE BLD-SCNC: 101 MMOL/L (ref 99–110)
CLARITY: ABNORMAL
CO2: 24 MMOL/L (ref 21–32)
COLOR: YELLOW
CREAT SERPL-MCNC: 0.9 MG/DL (ref 0.6–1.2)
CREATININE URINE: 132.2 MG/DL (ref 28–259)
EPITHELIAL CELLS, UA: 15 /HPF (ref 0–5)
GFR AFRICAN AMERICAN: >60
GFR NON-AFRICAN AMERICAN: >60
GLOBULIN: 3 G/DL
GLUCOSE BLD-MCNC: 106 MG/DL (ref 70–99)
GLUCOSE URINE: NEGATIVE MG/DL
HCT VFR BLD CALC: 39.6 % (ref 36–48)
HEMOGLOBIN: 13.5 G/DL (ref 12–16)
HYALINE CASTS: 5 /LPF (ref 0–8)
KETONES, URINE: NEGATIVE MG/DL
LEUKOCYTE ESTERASE, URINE: ABNORMAL
MCH RBC QN AUTO: 31.7 PG (ref 26–34)
MCHC RBC AUTO-ENTMCNC: 34.1 G/DL (ref 31–36)
MCV RBC AUTO: 93.1 FL (ref 80–100)
MICROSCOPIC EXAMINATION: YES
NITRITE, URINE: NEGATIVE
PDW BLD-RTO: 13.5 % (ref 12.4–15.4)
PH UA: 7.5 (ref 5–8)
PLATELET # BLD: 206 K/UL (ref 135–450)
PMV BLD AUTO: 9.4 FL (ref 5–10.5)
POTASSIUM SERPL-SCNC: 4 MMOL/L (ref 3.5–5.1)
PROTEIN PROTEIN: 25 MG/DL
PROTEIN UA: 30 MG/DL
RBC # BLD: 4.26 M/UL (ref 4–5.2)
RBC UA: 3 /HPF (ref 0–4)
SODIUM BLD-SCNC: 139 MMOL/L (ref 136–145)
SPECIFIC GRAVITY UA: 1.02 (ref 1–1.03)
TOTAL PROTEIN: 7.7 G/DL (ref 6.4–8.2)
URINE TYPE: ABNORMAL
UROBILINOGEN, URINE: 0.2 E.U./DL
WBC # BLD: 6.5 K/UL (ref 4–11)
WBC UA: 183 /HPF (ref 0–5)

## 2021-10-15 PROCEDURE — 82570 ASSAY OF URINE CREATININE: CPT

## 2021-10-15 PROCEDURE — 85027 COMPLETE CBC AUTOMATED: CPT

## 2021-10-15 PROCEDURE — 80053 COMPREHEN METABOLIC PANEL: CPT

## 2021-10-15 PROCEDURE — 84156 ASSAY OF PROTEIN URINE: CPT

## 2021-10-15 PROCEDURE — 86038 ANTINUCLEAR ANTIBODIES: CPT

## 2021-10-15 PROCEDURE — 81001 URINALYSIS AUTO W/SCOPE: CPT

## 2021-10-15 PROCEDURE — 86160 COMPLEMENT ANTIGEN: CPT

## 2021-10-15 PROCEDURE — 36415 COLL VENOUS BLD VENIPUNCTURE: CPT

## 2021-10-16 LAB — ANTI-NUCLEAR ANTIBODY (ANA): NEGATIVE

## 2021-12-09 RX ORDER — CALCITRIOL 0.25 UG/1
CAPSULE, LIQUID FILLED ORAL
Qty: 30 CAPSULE | Refills: 3 | Status: SHIPPED | OUTPATIENT
Start: 2021-12-09 | End: 2022-02-25 | Stop reason: SDUPTHER

## 2022-02-25 ENCOUNTER — OFFICE VISIT (OUTPATIENT)
Dept: ENDOCRINOLOGY | Age: 62
End: 2022-02-25
Payer: COMMERCIAL

## 2022-02-25 VITALS
HEIGHT: 67 IN | HEART RATE: 78 BPM | SYSTOLIC BLOOD PRESSURE: 126 MMHG | BODY MASS INDEX: 28.56 KG/M2 | OXYGEN SATURATION: 96 % | WEIGHT: 182 LBS | RESPIRATION RATE: 14 BRPM | TEMPERATURE: 98 F | DIASTOLIC BLOOD PRESSURE: 74 MMHG

## 2022-02-25 DIAGNOSIS — E83.51 HYPOCALCEMIA: ICD-10-CM

## 2022-02-25 DIAGNOSIS — E21.0 PRIMARY HYPERPARATHYROIDISM (HCC): ICD-10-CM

## 2022-02-25 DIAGNOSIS — E04.2 MULTINODULAR GOITER: ICD-10-CM

## 2022-02-25 DIAGNOSIS — M85.80 OSTEOPENIA, UNSPECIFIED LOCATION: ICD-10-CM

## 2022-02-25 DIAGNOSIS — E89.2 STATUS POST PARATHYROIDECTOMY (HCC): ICD-10-CM

## 2022-02-25 DIAGNOSIS — E66.3 OVERWEIGHT (BMI 25.0-29.9): ICD-10-CM

## 2022-02-25 DIAGNOSIS — E89.2 POSTSURGICAL HYPOPARATHYROIDISM (HCC): ICD-10-CM

## 2022-02-25 DIAGNOSIS — E21.0 PRIMARY HYPERPARATHYROIDISM (HCC): Primary | ICD-10-CM

## 2022-02-25 LAB
A/G RATIO: 2 (ref 1.1–2.2)
ALBUMIN SERPL-MCNC: 4.9 G/DL (ref 3.4–5)
ALP BLD-CCNC: 77 U/L (ref 40–129)
ALT SERPL-CCNC: 15 U/L (ref 10–40)
ANION GAP SERPL CALCULATED.3IONS-SCNC: 13 MMOL/L (ref 3–16)
AST SERPL-CCNC: 20 U/L (ref 15–37)
BILIRUB SERPL-MCNC: 0.6 MG/DL (ref 0–1)
BUN BLDV-MCNC: 25 MG/DL (ref 7–20)
CALCIUM SERPL-MCNC: 9.5 MG/DL (ref 8.3–10.6)
CHLORIDE BLD-SCNC: 101 MMOL/L (ref 99–110)
CO2: 25 MMOL/L (ref 21–32)
CREAT SERPL-MCNC: 0.8 MG/DL (ref 0.6–1.2)
GFR AFRICAN AMERICAN: >60
GFR NON-AFRICAN AMERICAN: >60
GLUCOSE BLD-MCNC: 80 MG/DL (ref 70–99)
MAGNESIUM: 2.2 MG/DL (ref 1.8–2.4)
PARATHYROID HORMONE INTACT: 6.5 PG/ML (ref 14–72)
PHOSPHORUS: 4.4 MG/DL (ref 2.5–4.9)
POTASSIUM SERPL-SCNC: 5 MMOL/L (ref 3.5–5.1)
SODIUM BLD-SCNC: 139 MMOL/L (ref 136–145)
T3 FREE: 3.3 PG/ML (ref 2.3–4.2)
T4 FREE: 1.4 NG/DL (ref 0.9–1.8)
TOTAL PROTEIN: 7.3 G/DL (ref 6.4–8.2)
TSH SERPL DL<=0.05 MIU/L-ACNC: 2.33 UIU/ML (ref 0.27–4.2)
VITAMIN D 25-HYDROXY: 50.2 NG/ML

## 2022-02-25 PROCEDURE — G8419 CALC BMI OUT NRM PARAM NOF/U: HCPCS | Performed by: INTERNAL MEDICINE

## 2022-02-25 PROCEDURE — G8427 DOCREV CUR MEDS BY ELIG CLIN: HCPCS | Performed by: INTERNAL MEDICINE

## 2022-02-25 PROCEDURE — 99214 OFFICE O/P EST MOD 30 MIN: CPT | Performed by: INTERNAL MEDICINE

## 2022-02-25 PROCEDURE — 4004F PT TOBACCO SCREEN RCVD TLK: CPT | Performed by: INTERNAL MEDICINE

## 2022-02-25 PROCEDURE — G8484 FLU IMMUNIZE NO ADMIN: HCPCS | Performed by: INTERNAL MEDICINE

## 2022-02-25 PROCEDURE — 3017F COLORECTAL CA SCREEN DOC REV: CPT | Performed by: INTERNAL MEDICINE

## 2022-02-25 RX ORDER — CALCITRIOL 0.25 UG/1
CAPSULE, LIQUID FILLED ORAL
Qty: 30 CAPSULE | Refills: 3 | Status: SHIPPED | OUTPATIENT
Start: 2022-02-25 | End: 2022-06-03 | Stop reason: SDUPTHER

## 2022-02-25 RX ORDER — MELATONIN
1000 DAILY
Qty: 90 TABLET | Refills: 1 | Status: CANCELLED | OUTPATIENT
Start: 2022-02-25

## 2022-02-25 NOTE — PROGRESS NOTES
SUBJECTIVE:  Letty Cope is a 59 y.o. female who is being evaluated for hyperparathyroidism. 1. Primary hyperparathyroidism ([address]    This started in 2016. Patient was diagnosed with hyperparathyroidism. The problem has been gradually worsening. Patient started medication in 2020. Currently patient is on: Calcitriol, vitamin D, magnesium, Tums. Misses  0 doses a month. Current complaints: No tingling, but has foot cramps  Hot flashes 1-2 times daily    2. Status post parathyroidectomy    Parathyroidectomy 9/15/2020  4 gland hyperplasia, gland reimplantation into left sternocleidomastoid muscle on 9/15/2020    3. Hypocalcemia/postsurgical hypoparathyroidism  Patient has foot cramps  Patient developed hypocalcemia after parathyroid surgery. Tums 1000 mg 2 tablets 3 times a day, calcitriol 0.5 mcg daily, magnesium 250 mg daily, vitamin D 1000 international units daily    4. Osteopenia, unspecified location  Had rib fractures  Mother had hip fracture  No steroids long term. No RA  Smoker    5. Multinodular goiter  History of obstructive symptoms: difficulty swallowing No, changes in voice/hoarseness Yes. History of radiation to patient's neck: No  Resent iodine exposure: No  Family history includes mother had neck surgery  Family history of thyroid cancer: No    6. Overweight (BMI 25.0-29. 9)  Eats healthy, active    7. Chronic kidney disease stage 3  No urination problems      OSTEOPENIA. T-scores are in the low bone density range, meeting WHO criteria for osteopenia. Potential treatment should be based on individual risk factors. It is important to exclude secondary causes of low bone density. Patients with low bone density   should have regular DXA scans. The scanning interval should be determined according to the clinical status of the patient and/or the treatment regimen.     INTERVAL CHANGE: Not applicable    Result Narrative   HISTORY: Encounter for screening for osteoporosis  Screening for osteoporosis  COMPARISON:  None  NOTE:  If there are questions about the content of this report, please [place] [facility] calling [phone]    DXA machine: Sheryle Rocker (S/N [id number]). 100 [facility] in g/cm2 at 95% confidence: Lumbar spine NA, Total hip NA. FRAX software version: 3.08  COMPARISON machine:  None    TECHNICAL LIMITATIONS/EXCLUSIONS: None   SKELETAL SITES (REGIONS OF INTEREST): Lumbar spine (L1-4) and left hip    FINDINGS:    LUMBAR SPINE:  T-score for the lumbar spine: -1.7  BMD for the lumbar spine in g/cm2: 0.861     HIP:  T-score for the femoral neck: -1.9  BMD for the femoral neck in g/cm2: 0.634   T-score for the total hip: -1.6  BMD for the total hip in g/cm2: 0.742     FRAX:  FRAX risk factor(s): Tobacco use   Major osteoporotic fracture risk (%): 9.7%  Hip fracture risk(%):  2.0%      NOTES:  National Osteoporosis Foundation criteria for using FRAX: untreated postmenopausal women or men 48 or older with T-score between -1.0 and -2.5, no prior hip or vertebral fracture and a DXA evaluable hip. FRAX can be calculated outside of these parameters if desired. The FRAX online calculator is available at  www. jeevan. ac.uk/FRAX/    World Health Organization reference values:  (based on lowest T-score of the lumbar spine, femoral neck, total hip, or 33% radius)  Normal:  T score at or above -1.0   Osteopenia:  T score between -1.0 and -2.5  Osteoporosis:  T score at or below -2.5  Severe osteoporosis: T-score at or below -2.5 and a fragility fracture (prior or new)    Approaches to reduce osteoporosis-related fracture risk include optimizing calcium and vitamin D status and fall-prevention measures.  The National Osteoporosis Foundation treatment guidelines recommend treatment for :  -  Those with hip fracture or vertebral fracture (clinical or asymptomatic)  -  Those with T-score -2.5 or lower at total hip, femoral neck or spine by DXA, after appropriate evaluation  -  Low bone mass and 10-year probability of a hip fracture equal to or greater than 3% or a 10-year probability of a major osteoporotic fracture equal to or greater than 20% (FRAX). All treatment decisions require clinical management and consideration of individual factors including patient preferences, comorbidities, prior drug use, risk factors not captured in FRAX model (e.g.-sarcopenia, vitamin D deficiency, increased bone   turnover, interval significant decline in bone density) and possible under or over estimation of fracture risk by FRAX   Status Results Details              Result Impression     Diffuse small right thyroid nodules. No evidence of enlarged parathyroid nodule. RECOMMENDATION:   No FNA biopsy or imaging followup recommended   Result Narrative   HISTORY: Primary hyperparathyroidism  COMPARISON: None  TECHNIQUE:  Ultrasound images of the thyroid gland  NOTE:  If there are questions about the content of this report, please contact [address] [place] [address] radiology by calling [phone]    FINDINGS:    RIGHT LOBE MEASUREMENTS:  5.0 x 1.6 x 1.4 cm (normal <  5 x 1.5 x 1.5 cm)  LEFT LOBE MEASUREMENTS:  4.8 x 1.3 x 1.1 cm (normal <  5 x 1.5 x 1.5 cm)  ADJACENT MASSES OR ENLARGED LYMPH NODES:  None    THYROID GLAND:    One or more subcentimeter thyroid nodule(s) which are smoothly marginated, not taller-than-wide, and with no echogenic foci, deemed to be clinically insignificant. There is a small round solid nodule in right lobe measuring 8 mm x 5 mm x 8 mm. Lesion is   slightly hyperechoic. In the posterior aspect of the lower pole right lobe there is heterogenous nodule measuring approximately 6 cm x 5 mm x 5 mm.     NOTE: Recommendations are based on the TI-RADS 2017 [employer] ultrasound-based risk stratification system to classify thyroid nodules that may warrant biopsy or sonographic follow-up   Other Result Information   Interface, Rad Results In - 06/30/2020  5:02 PM EDT  HISTORY: Primary hyperparathyroidism  COMPARISON: None  TECHNIQUE:  Ultrasound images of the thyroid gland  NOTE:  If there are questions about the content of this report, please contact [address] [place] [address] radiology by calling [phone]    FINDINGS:    RIGHT LOBE MEASUREMENTS:  5.0 x 1.6 x 1.4 cm (normal <  5 x 1.5 x 1.5 cm)  LEFT LOBE MEASUREMENTS:  4.8 x 1.3 x 1.1 cm (normal <  5 x 1.5 x 1.5 cm)  ADJACENT MASSES OR ENLARGED LYMPH NODES:  None    THYROID GLAND:    One or more subcentimeter thyroid nodule(s) which are smoothly marginated, not taller-than-wide, and with no echogenic foci, deemed to be clinically insignificant. There is a small round solid nodule in right lobe measuring 8 mm x 5 mm x 8 mm. Lesion is   slightly hyperechoic. In the posterior aspect of the lower pole right lobe there is heterogenous nodule measuring approximately 6 cm x 5 mm x 5 mm. NOTE: Recommendations are based on the TI-RADS 2017 [employer] ultrasound-based risk stratification system to classify thyroid nodules that may warrant biopsy or sonographic follow-up    IMPRESSION    Diffuse small right thyroid nodules. No evidence of enlarged parathyroid nodule. RECOMMENDATION:   No FNA biopsy or imaging followup recommended   Status Results Details            Past Medical History:   Diagnosis Date    Hypertension      Patient Active Problem List    Diagnosis Date Noted    Postsurgical hypoparathyroidism ([address] 02/09/2021    Multinodular goiter 02/08/2021    Overweight (BMI 25.0-29.9) 02/08/2021    Primary hyperparathyroidism ([address] 02/07/2021    Status post parathyroidectomy ([address] 02/07/2021    Hypocalcemia 02/07/2021    Osteopenia 02/07/2021     History reviewed. No pertinent surgical history.   Family History   Problem Relation Age of Onset    Cancer Mother      Social History     Socioeconomic History    Marital status: Single     Spouse name: None    Number of children: None    Years of education: None    Highest education level: None   Occupational History    None   Tobacco Use    Smoking status: Current Every Day Smoker     Packs/day: 1.00     Years: 15.00     Pack years: 15.00     Types: Cigarettes    Smokeless tobacco: Never Used    Tobacco comment: some days smoking up to 2 packs    Vaping Use    Vaping Use: Never used   Substance and Sexual Activity    Alcohol use: Not Currently     Comment: socially    Drug use: Not Currently     Types: Marijuana Jenny Lawler)    Sexual activity: Yes   Other Topics Concern    None   Social History Narrative    None     Social Determinants of Health     Financial Resource Strain:     Difficulty of Paying Living Expenses: Not on file   Food Insecurity:     Worried About Running Out of Food in the Last Year: Not on file    Vicki of Food in the Last Year: Not on file   Transportation Needs:     Lack of Transportation (Medical): Not on file    Lack of Transportation (Non-Medical):  Not on file   Physical Activity:     Days of Exercise per Week: Not on file    Minutes of Exercise per Session: Not on file   Stress:     Feeling of Stress : Not on file   Social Connections:     Frequency of Communication with Friends and Family: Not on file    Frequency of Social Gatherings with Friends and Family: Not on file    Attends Druze Services: Not on file    Active Member of [address] [place] [address] [employer] or Organizations: Not on file    Attends Club or Organization Meetings: Not on file    Marital Status: Not on file   Intimate Partner Violence:     Fear of Current or Ex-Partner: Not on file    Emotionally Abused: Not on file    Physically Abused: Not on file    Sexually Abused: Not on file   Housing Stability:     Unable to Pay for Housing in the Last Year: Not on file    Number of [place] in the Last Year: Not on file    Unstable Housing in the Last Year: Not on file     Current Outpatient Medications   Medication Sig Dispense Refill    calcitRIOL (ROCALTROL) 0.25 MCG capsule TAKE ONE CAPSULE BY MOUTH DAILY 30 capsule 3    amLODIPine (NORVASC) 5 MG tablet TAKE ONE TABLET BY MOUTH DAILY 30 tablet 3    lisinopril (PRINIVIL;ZESTRIL) 10 MG tablet Take 1 tablet by mouth daily 90 tablet 1    atorvastatin (LIPITOR) 40 MG tablet TAKE ONE TABLET BY MOUTH DAILY      loratadine (CLARITIN) 10 MG tablet Take 10 mg by mouth daily      vitamin D3 (CHOLECALCIFEROL) 25 MCG (1000 UT) TABS tablet Take 1 tablet by mouth daily 90 tablet 1     No current facility-administered medications for this visit.      Allergies   Allergen Reactions    Methylene Blue Other (See Comments)     Pain in extremity of infusion \"throbbing, and it feels like a tourniquet is on my arm\"    Pcn [Penicillins] Hives     Family Status   Relation Name Status    Mother  (Not Specified)       Review of Systems:  Constitutional: no fatigue, no fever, no recent weight gain, no recent weight loss, no changes in appetite  Eyes: no eye pain, has change in vision, no eye redness, no eye irritation, no double vision  Ears, nose, throat: has nasal congestion, no sore throat, no earache, no decrease in hearing, no hoarseness, no dry mouth, has sinus problems, no difficulty swallowing, no neck lumps, no dental problems, no mouth sores, no ringing in ears  Pulmonary: no shortness of breath, no wheezing, no dyspnea on exertion, no cough  Cardiovascular: no chest pain, no lower extremity edema, no orthopnea, no intermittent leg claudication, has palpitations  Gastrointestinal: no abdominal pain, no nausea, no vomiting, no diarrhea, has constipation, no dysphagia, has heartburn, no bloating  Genitourinary: no dysuria, no urinary incontinence, no urinary hesitancy, no urinary frequency, no feelings of urinary urgency, no nocturia  Musculoskeletal: no joint swelling, no joint stiffness, has joint pain, has muscle cramps, has muscle pain, no bone pain  Integument/Breast: no hair loss, no skin rashes, no skin lesions, no itching, no dry skin  Neurological: has numbness, has tingling, no weakness, no confusion, has headaches, has dizziness, no fainting, no tremors, no decrease in memory, no balance problems  Psychiatric: no anxiety, no depression, no insomnia  Hematologic/Lymphatic: no tendency for easy bleeding, no swollen lymph nodes, no tendency for easy bruising  Immunology: has seasonal allergies, no frequent infections, no frequent illnesses  Endocrine: no temperature intolerance    /74   Pulse 78   Temp 98 °F (36.7 °C)   Resp 14   Ht 5' 7\" (1.702 m)   Wt 182 lb (82.6 kg)   LMP 07/07/2012   SpO2 96%   BMI 28.51 kg/m²    Wt Readings from Last 3 Encounters:   02/25/22 182 lb (82.6 kg)   10/22/21 180 lb (81.6 kg)   09/24/21 178 lb 8 oz (81 kg)     Body mass index is 28.51 kg/m².     OBJECTIVE:  Constitutional: no acute distress, well appearing and well nourished  Psychiatric: oriented to person, place and time, judgement and insight and normal, recent and remote memory and intact and mood and affect are normal  Skin: skin and subcutaneous tissue is normal without mass, normal turgor  Head and Face: examination of head and face revealed no abnormalities  Eyes: no lid or conjunctival swelling, erythema or discharge, pupils are normal, equal, round, reactive to light  Ears/Nose: external inspection of ears and nose revealed no abnormalities, hearing is grossly normal  Oropharynx/Mouth/Face: lips, tongue and gums are normal with no lesions, the voice quality was normal  Neck: neck is supple and symmetric, with midline trachea and no masses, thyroid is enlarged  Lymphatics: normal cervical lymph nodes, normal supraclavicular nodes  Pulmonary: no increased work of breathing or signs of respiratory distress, lungs are clear to auscultation  Cardiovascular: normal heart rate and rhythm, normal S1 and S2, no murmurs and pedal pulses and 2+ bilaterally, No edema  Abdomen: abdomen is soft, non-tender with no masses  Musculoskeletal: normal gait and station and exam of the digits and nails are normal  Neurological: normal coordination and normal general cortical function      Lab Review:    Lab Results   Component Value Date    WBC 6.5 10/15/2021    HGB 13.5 10/15/2021    HCT 39.6 10/15/2021    MCV 93.1 10/15/2021     10/15/2021     Lab Results   Component Value Date     10/15/2021    K 4.0 10/15/2021     10/15/2021    CO2 24 10/15/2021    BUN 18 10/15/2021    CREATININE 0.9 10/15/2021    GLUCOSE 106 10/15/2021    CALCIUM 9.7 10/15/2021    PROT 7.7 10/15/2021    LABALBU 4.7 10/15/2021    BILITOT 0.6 10/15/2021    ALKPHOS 71 10/15/2021    AST 20 10/15/2021    ALT 19 10/15/2021    LABGLOM >60 10/15/2021    GFRAA >60 10/15/2021    AGRATIO 1.6 10/15/2021    GLOB 3.0 10/15/2021     Lab Results   Component Value Date    TSH 3.190 08/26/2021    FT3 2.8 08/26/2021     No results found for: LABA1C  No results found for: EAG  No results found for: CHOL  No results found for: TRIG  No results found for: HDL  No results found for: LDLCHOLESTEROL, LDLCALC  No results found for: LABVLDL, VLDL  No results found for: [facility]  Lab Results   Component Value Date    LABMICR YES 10/15/2021     Lab Results   Component Value Date    VITD25 42.3 08/26/2021        ASSESSMENT/PLAN:  1. Primary hyperparathyroidism ([facility] [address]  Calcium 10.29.511.09.7  Uncontrolled postsurgical hypoparathyroidism  Parathyroidectomy 9/15/2020  - Comprehensive Metabolic Panel; Future  - Calcium Ionized Serum; Future  - PTH, Intact; Future  - Phosphorus; Future  - Magnesium; Future  - Vitamin D 25 Hydroxy; Future    2. Status post parathyroidectomy  Parathyroidectomy 9/15/2020  4 gland hyperplasia, gland reimplantation into left sternocleidomastoid muscle on 9/15/2020  - Comprehensive Metabolic Panel; Future  - Calcium Ionized Serum; Future  - PTH, Intact; Future  - Phosphorus; Future  - Magnesium; Future  - Vitamin D 25 Hydroxy; Future    3.  Hypocalcemia/postsurgical hypoparathyroidism  PTH <1.2710  Calcium 9.511.09.7  25OHvitamin D 38.837.742.3  Uncontrolled postsurgical hypoparathyroidism  Calcitriol 0.25 mcg daily   Continue close monitoring  - calcitRIOL (ROCALTROL) 0.25 MCG capsule; Take 0.25 mcg by mouth daily  - magnesium 400 mg daily was disconnected because of diarrhea  - vitamin D3 (CHOLECALCIFEROL) 25 MCG (1000 UT) TABS tablet; Take 1 tablet by mouth daily   - Tums 2 tablets twice daily  - Vitamin D 25 Hydroxy; Future  - Calcium Ionized Serum; Future  - PTH, Intact; Future  - Phosphorus; Future  - Magnesium; Future  - Comprehensive Metabolic Panel; Future    4. Osteopenia, unspecified location  Last DEXA January 2020  Monitor bone density  OSTEOPENIA. T-scores are in the low bone density range, meeting WHO criteria for osteopenia. INTERVAL CHANGE: Not applicable   COMPARISON:  None   DXA machine: Rabixo W (S/N [id number]). 100 [facility] in g/cm2 at 95% confidence: Lumbar spine NA, Total hip NA. FRAX software version: 3.08   LUMBAR SPINE:   T-score for the lumbar spine: -1.7   BMD for the lumbar spine in g/cm2: 0.861   HIP:   T-score for the femoral neck: -1.9   BMD for the femoral neck in g/cm2: 0.634   T-score for the total hip: -1.6   BMD for the total hip in g/cm2: 0.742     FRAX:   FRAX risk factor(s): Tobacco use   Major osteoporotic fracture risk (%): 9.7%   Hip fracture risk(%):  2.0%        - Comprehensive Metabolic Panel; Future  - Vitamin D 25 Hydroxy; Future    5. Multinodular goiter  Small thyroid nodules  Continue monitoring  TSH 2.271.9  Thyroid sonogram  - T3, Free; Future  - T4, Free; Future  - TSH without Reflex; Future    6. Overweight (BMI 25.0-29. 9)  Diet, exercise    7.   Chronic kidney disease stage 3  Creatinine 1.350.9  Decreased GFR 42>60  Feet cramps  Refer to Nephrologist    Reviewed and/or ordered clinical lab results Yes  Reviewed and/or ordered radiology tests Yes   Reviewed and/or ordered other diagnostic tests No  Discussed test results with performing physician No  Independently reviewed image, tracing, or specimen No  Made a decision to obtain old records No  Reviewed and summarized old records Yes   Calcium 1110.811.110.710.810.77.9, upper limit normal 10.4  Elevated calcium since 2016  25 hydroxy vitamin D 23.737  PTH 90.781.806.086.73  TSH 2.15  SURGICAL PATHOLOGY REPORT    Name: Joanie Landin  EPI#: [id number]  Acct#: [de-identified]    Case #: [id number]    Final Pathologic Diagnosis  A.  Possible left possible superior parathyroid, biopsy:  -     Parathyroid tissue, 0.003 g. [place] left inferior parathyroid, excision:  -     Hypercellular parathyroid tissue,0.093 g.  -     Thymic tissue. [place] right inferior parathyroid gland, excision:  -     Adipose tissue. D.  Possible right superior parathyroid, excision:  -     Hypercellular parathyroid tissue, 0.164 g.    E.  Possible right inferior parathyroid, excision:  -          Fragments of thyroid and parathyroid tissue, 0.091 g. MCS2/MCS2/9/16/2020 11:54:37  Electronically Signed Out            Zuly Smallwood MD  Specimen(s) Received  A.  Possible left possible superior parathyroid  B.  Possible left inferior parathyroid  C.  Possible right inferior parathyroid gland  D.  Possible right superior parathyroid  E.  Possible right inferior parathyroid    Obtained history from other than patient No    Evan Espinoza was counseled regarding symptoms of primary hyperparathyroidism, postsurgical hypoparathyroidism, thyroid disease, multinodular goiter diagnosis, course and complications of disease if inadequately treated, side effects of medications, diagnosis, treatment options, and prognosis, risks, benefits, complications, and alternatives of treatment, labs, imaging and other studies and treatment targets and goals, signs and symptoms of hyperparathyroidism, treatment, side effects of medications, risk of cancer in thyroid nodules.   She understands instructions and

## 2022-02-26 ENCOUNTER — TELEPHONE (OUTPATIENT)
Dept: ENDOCRINOLOGY | Age: 62
End: 2022-02-26

## 2022-02-27 LAB
CALCIUM IONIZED, CALC AT PH 7.4: 1.26 MMOL/L (ref 1.11–1.3)
CALCIUM IONIZED: 1.21 MMOL/L (ref 1.11–1.3)

## 2022-04-25 ENCOUNTER — HOSPITAL ENCOUNTER (OUTPATIENT)
Age: 62
Discharge: HOME | End: 2022-04-25
Payer: COMMERCIAL

## 2022-04-25 DIAGNOSIS — E83.51 HYPOCALCEMIA: ICD-10-CM

## 2022-04-25 DIAGNOSIS — E89.2 STATUS POST PARATHYROIDECTOMY (HCC): ICD-10-CM

## 2022-04-25 DIAGNOSIS — E89.2 POSTSURGICAL HYPOPARATHYROIDISM (HCC): ICD-10-CM

## 2022-04-25 DIAGNOSIS — M85.80 OSTEOPENIA, UNSPECIFIED LOCATION: ICD-10-CM

## 2022-04-25 DIAGNOSIS — N18.31 STAGE 3A CHRONIC KIDNEY DISEASE (HCC): ICD-10-CM

## 2022-04-25 DIAGNOSIS — E04.2 MULTINODULAR GOITER: ICD-10-CM

## 2022-04-25 DIAGNOSIS — E21.0 PRIMARY HYPERPARATHYROIDISM (HCC): ICD-10-CM

## 2022-04-25 DIAGNOSIS — E66.3 OVERWEIGHT (BMI 25.0-29.9): ICD-10-CM

## 2022-04-25 LAB
A/G RATIO: 1.9 (ref 1.1–2.2)
ALBUMIN SERPL-MCNC: 4.8 G/DL (ref 3.4–5)
ALP BLD-CCNC: 77 U/L (ref 40–129)
ALT SERPL-CCNC: 16 U/L (ref 10–40)
ANION GAP SERPL CALCULATED.3IONS-SCNC: 14 MMOL/L (ref 3–16)
AST SERPL-CCNC: 20 U/L (ref 15–37)
BILIRUB SERPL-MCNC: 0.6 MG/DL (ref 0–1)
BILIRUBIN URINE: NEGATIVE
BLOOD, URINE: NEGATIVE
BUN BLDV-MCNC: 27 MG/DL (ref 7–20)
CALCIUM SERPL-MCNC: 8.6 MG/DL (ref 8.3–10.6)
CHLORIDE BLD-SCNC: 101 MMOL/L (ref 99–110)
CLARITY: CLEAR
CO2: 24 MMOL/L (ref 21–32)
COLOR: YELLOW
CREAT SERPL-MCNC: 0.9 MG/DL (ref 0.6–1.2)
CREATININE URINE: 28.6 MG/DL (ref 28–259)
GFR AFRICAN AMERICAN: >60
GFR NON-AFRICAN AMERICAN: >60
GLUCOSE BLD-MCNC: 91 MG/DL (ref 70–99)
GLUCOSE URINE: NEGATIVE MG/DL
KETONES, URINE: NEGATIVE MG/DL
LEUKOCYTE ESTERASE, URINE: NEGATIVE
MAGNESIUM: 2 MG/DL (ref 1.8–2.4)
MICROSCOPIC EXAMINATION: NORMAL
NITRITE, URINE: NEGATIVE
PARATHYROID HORMONE INTACT: 7.7 PG/ML (ref 14–72)
PH UA: 6.5 (ref 5–8)
PHOSPHORUS: 4.5 MG/DL (ref 2.5–4.9)
POTASSIUM SERPL-SCNC: 4.1 MMOL/L (ref 3.5–5.1)
PROTEIN PROTEIN: <4 MG/DL
PROTEIN UA: NEGATIVE MG/DL
SODIUM BLD-SCNC: 139 MMOL/L (ref 136–145)
SPECIFIC GRAVITY UA: 1.01 (ref 1–1.03)
T3 FREE: 2.9 PG/ML (ref 2.3–4.2)
T4 FREE: 1.2 NG/DL (ref 0.9–1.8)
TOTAL PROTEIN: 7.3 G/DL (ref 6.4–8.2)
TSH SERPL DL<=0.05 MIU/L-ACNC: 2.15 UIU/ML (ref 0.27–4.2)
URINE TYPE: NORMAL
UROBILINOGEN, URINE: 0.2 E.U./DL
VITAMIN D 25-HYDROXY: 40.9 NG/ML

## 2022-04-25 PROCEDURE — 82330 ASSAY OF CALCIUM: CPT

## 2022-04-25 PROCEDURE — 80053 COMPREHEN METABOLIC PANEL: CPT

## 2022-04-25 PROCEDURE — 84100 ASSAY OF PHOSPHORUS: CPT

## 2022-04-25 PROCEDURE — 84443 ASSAY THYROID STIM HORMONE: CPT

## 2022-04-25 PROCEDURE — 83970 ASSAY OF PARATHORMONE: CPT

## 2022-04-25 PROCEDURE — 82306 VITAMIN D 25 HYDROXY: CPT

## 2022-04-25 PROCEDURE — 82570 ASSAY OF URINE CREATININE: CPT

## 2022-04-25 PROCEDURE — 36415 COLL VENOUS BLD VENIPUNCTURE: CPT

## 2022-04-25 PROCEDURE — 83735 ASSAY OF MAGNESIUM: CPT

## 2022-04-25 PROCEDURE — 84439 ASSAY OF FREE THYROXINE: CPT

## 2022-04-25 PROCEDURE — 84156 ASSAY OF PROTEIN URINE: CPT

## 2022-04-25 PROCEDURE — 81003 URINALYSIS AUTO W/O SCOPE: CPT

## 2022-04-25 PROCEDURE — 84481 FREE ASSAY (FT-3): CPT

## 2022-04-27 LAB
CALCIUM IONIZED, CALC AT PH 7.4: 1.13 MMOL/L (ref 1.11–1.3)
CALCIUM IONIZED: 1.11 MMOL/L (ref 1.11–1.3)

## 2022-05-04 ENCOUNTER — TELEPHONE (OUTPATIENT)
Dept: ENDOCRINOLOGY | Age: 62
End: 2022-05-04

## 2022-05-04 NOTE — TELEPHONE ENCOUNTER
Pt called to inform dr Salinas Later ordered tests to [facility], they were for dexa bone density and ultra sound of thyroid. Emanuel cabrera said they need the doctors office to fax the order to [phone]. Please fax today so she can call to schedule. The tests need to be completed prior to June 2.

## 2022-06-03 ENCOUNTER — OFFICE VISIT (OUTPATIENT)
Dept: ENDOCRINOLOGY | Age: 62
End: 2022-06-03
Payer: COMMERCIAL

## 2022-06-03 VITALS
WEIGHT: 180 LBS | RESPIRATION RATE: 14 BRPM | OXYGEN SATURATION: 98 % | BODY MASS INDEX: 28.25 KG/M2 | SYSTOLIC BLOOD PRESSURE: 138 MMHG | TEMPERATURE: 98 F | HEIGHT: 67 IN | HEART RATE: 80 BPM | DIASTOLIC BLOOD PRESSURE: 80 MMHG

## 2022-06-03 DIAGNOSIS — E04.2 MULTINODULAR GOITER: ICD-10-CM

## 2022-06-03 DIAGNOSIS — E66.3 OVERWEIGHT (BMI 25.0-29.9): ICD-10-CM

## 2022-06-03 DIAGNOSIS — E89.2 POSTSURGICAL HYPOPARATHYROIDISM (HCC): ICD-10-CM

## 2022-06-03 DIAGNOSIS — M85.80 OSTEOPENIA, UNSPECIFIED LOCATION: ICD-10-CM

## 2022-06-03 DIAGNOSIS — E89.2 STATUS POST PARATHYROIDECTOMY (HCC): ICD-10-CM

## 2022-06-03 DIAGNOSIS — N18.31 STAGE 3A CHRONIC KIDNEY DISEASE (HCC): ICD-10-CM

## 2022-06-03 DIAGNOSIS — E83.51 HYPOCALCEMIA: ICD-10-CM

## 2022-06-03 DIAGNOSIS — E21.0 PRIMARY HYPERPARATHYROIDISM (HCC): Primary | ICD-10-CM

## 2022-06-03 PROCEDURE — G8427 DOCREV CUR MEDS BY ELIG CLIN: HCPCS | Performed by: INTERNAL MEDICINE

## 2022-06-03 PROCEDURE — 3017F COLORECTAL CA SCREEN DOC REV: CPT | Performed by: INTERNAL MEDICINE

## 2022-06-03 PROCEDURE — G8419 CALC BMI OUT NRM PARAM NOF/U: HCPCS | Performed by: INTERNAL MEDICINE

## 2022-06-03 PROCEDURE — 99215 OFFICE O/P EST HI 40 MIN: CPT | Performed by: INTERNAL MEDICINE

## 2022-06-03 PROCEDURE — 4004F PT TOBACCO SCREEN RCVD TLK: CPT | Performed by: INTERNAL MEDICINE

## 2022-06-03 RX ORDER — MELATONIN
2000 DAILY
Qty: 100 TABLET | Refills: 0
Start: 2022-06-03

## 2022-06-03 RX ORDER — CALCITRIOL 0.25 UG/1
CAPSULE, LIQUID FILLED ORAL
Qty: 30 CAPSULE | Refills: 3 | Status: SHIPPED | OUTPATIENT
Start: 2022-06-03 | End: 2022-10-07 | Stop reason: SDUPTHER

## 2022-06-03 NOTE — PROGRESS NOTES
Impression/Conclusion below       HISTORY: Primary hyperparathyroidism; Other specified disorders of bone density and structure,    unspecified site  Screening for osteoporosis   COMPARISON:  2020   NOTE:  If there are questions about the content of this report, please contact [facility]    radiology by calling [phone]       DXA machine: Ludivina Mcelroy (S/N [id number]). [facility] in g/cm2 at 95% confidence: Lumbar spine    0.059, Total hip 0.037. FRAX software version: 3.08.   COMPARISON machine:  Same       TECHNICAL LIMITATIONS/EXCLUSIONS: None        FINDINGS:       LUMBAR SPINE L1-L4:   T-score: -1.0   BMD in g/cm2: 0.939        LEFT HIP:   Neck T-score: -1.7   Neck BMD in g/cm2: 0.656    Total T-score: -1.1   Total BMD in g/cm2: 0.807        LEFT 33% RADIUS:   T-score: -1.5   BMD in g/cm2: 0.603       FRAX:   FRAX risk factor(s): None    Major osteoporotic fracture risk: 27%   Hip fracture risk:  3.4%   The FRAX calculation is based on patient answers to a questionnaire and can be associated with    overestimation or underestimation of fracture risk. Therefore FRAX should be validated by the    ordering physician.       NOTE REGARDING TREATMENT:   Approaches to reduce osteoporosis-related fracture risk include optimizing calcium and vitamin    D status and fall-prevention measures. The National Osteoporosis Foundation treatment    guidelines recommend treatment for:   -  Those with hip fracture or vertebral fracture (clinical or asymptomatic)   -  Those with T-score -2.5 or lower at total hip, femoral neck or spine by DXA, after    appropriate evaluation   -  Low bone mass and 10-year probability of a hip fracture equal to or greater than 3% or a    10-year probability of a major osteoporotic fracture equal to or greater than 20% (FRAX). All treatment decisions require clinical management and consideration of individual factors.           IMPRESSION:           OSTEOPENIA.  T-scores are in the low bone density range, meeting WHO criteria for osteopenia. Potential treatment should be based on individual risk factors. It is important to exclude    secondary causes of low bone density. Patients with low bone density    should have regular DXA scans. The scanning interval should be determined according to the    clinical status of the patient and/or the treatment regimen.       INTERVAL CHANGE: There is a statistically significant change as follows: There has been a change of 0.078 g/cm2 or a 9% increase at the lumbar spine       Comparison is made using BMD, not T-scores. The spine followed by the total hip are the most    reliable anatomic sites for comparison. While the femoral neck and radius are validated for    diagnosis, these sites typically have lower precision and are    therefore less reliable for evaluating change in BMD and/or decision making regarding    pharmacologic therapy.       SIGNED BY: Nissa Prakash. Juan Ramon Garcia MD on 6/3/2022  9:44 AM        [facility] [phone] - [facility] Call Center: [phone]               OSTEOPENIA. T-scores are in the low bone density range, meeting WHO criteria for osteopenia. Potential treatment should be based on individual risk factors. It is important to exclude secondary causes of low bone density. Patients with low bone density   should have regular DXA scans. The scanning interval should be determined according to the clinical status of the patient and/or the treatment regimen. INTERVAL CHANGE: Not applicable    Result Narrative   HISTORY: Encounter for screening for osteoporosis  Screening for osteoporosis  COMPARISON:  None  NOTE:  If there are questions about the content of this report, please contact [address] [place] [address] radiology by calling [phone]    DXA machine: Eden Noel (S/N [id number]). [address] [place] [address] in g/cm2 at 95% confidence: Lumbar spine NA, Total hip NA.  FRAX software version: 3.08  COMPARISON machine:  None    TECHNICAL LIMITATIONS/EXCLUSIONS: None   SKELETAL SITES (REGIONS OF INTEREST): Lumbar spine (L1-4) and left hip    FINDINGS:    LUMBAR SPINE:  T-score for the lumbar spine: -1.7  BMD for the lumbar spine in g/cm2: 0.861     HIP:  T-score for the femoral neck: -1.9  BMD for the femoral neck in g/cm2: 0.634   T-score for the total hip: -1.6  BMD for the total hip in g/cm2: 0.742     FRAX:  FRAX risk factor(s): Tobacco use   Major osteoporotic fracture risk (%): 9.7%  Hip fracture risk(%):  2.0%      NOTES:  National Osteoporosis Foundation criteria for using FRAX: untreated postmenopausal women or men 48 or older with T-score between -1.0 and -2.5, no prior hip or vertebral fracture and a DXA evaluable hip. FRAX can be calculated outside of these parameters if desired. The FRAX online calculator is available at  www. jeevan. ac.uk/FRAX/    World Health Organization reference values:  (based on lowest T-score of the lumbar spine, femoral neck, total hip, or 33% radius)  Normal:  T score at or above -1.0   Osteopenia:  T score between -1.0 and -2.5  Osteoporosis:  T score at or below -2.5  Severe osteoporosis: T-score at or below -2.5 and a fragility fracture (prior or new)    Approaches to reduce osteoporosis-related fracture risk include optimizing calcium and vitamin D status and fall-prevention measures. The National Osteoporosis Foundation treatment guidelines recommend treatment for :  -  Those with hip fracture or vertebral fracture (clinical or asymptomatic)  -  Those with T-score -2.5 or lower at total hip, femoral neck or spine by DXA, after appropriate evaluation  -  Low bone mass and 10-year probability of a hip fracture equal to or greater than 3% or a 10-year probability of a major osteoporotic fracture equal to or greater than 20% (FRAX).   All treatment decisions require clinical management and consideration of individual factors including patient preferences, comorbidities, prior drug use, risk factors not captured in FRAX model (e.g.-sarcopenia, vitamin D deficiency, increased bone   turnover, interval significant decline in bone density) and possible under or over estimation of fracture risk by FRAX   Status Results Details              Result Impression     Diffuse small right thyroid nodules. No evidence of enlarged parathyroid nodule. RECOMMENDATION:   No FNA biopsy or imaging followup recommended   Result Narrative   HISTORY: Primary hyperparathyroidism  COMPARISON: None  TECHNIQUE:  Ultrasound images of the thyroid gland  NOTE:  If there are questions about the content of this report, please contact [address] [place] [address] by calling [phone]    FINDINGS:    RIGHT LOBE MEASUREMENTS:  5.0 x 1.6 x 1.4 cm (normal <  5 x 1.5 x 1.5 cm)  LEFT LOBE MEASUREMENTS:  4.8 x 1.3 x 1.1 cm (normal <  5 x 1.5 x 1.5 cm)  ADJACENT MASSES OR ENLARGED LYMPH NODES:  None    THYROID GLAND:    One or more subcentimeter thyroid nodule(s) which are smoothly marginated, not taller-than-wide, and with no echogenic foci, deemed to be clinically insignificant. There is a small round solid nodule in right lobe measuring 8 mm x 5 mm x 8 mm. Lesion is   slightly hyperechoic. In the posterior aspect of the lower pole right lobe there is heterogenous nodule measuring approximately 6 cm x 5 mm x 5 mm.     NOTE: Recommendations are based on the TI-RADS 2017 [address] [place] [address] Radiology ultrasound-based risk stratification system to classify thyroid nodules that may warrant biopsy or sonographic follow-up   Other Result Information   Interface, Rad Results In - 06/30/2020  5:02 PM EDT  HISTORY: Primary hyperparathyroidism  COMPARISON: None  TECHNIQUE:  Ultrasound images of the thyroid gland  NOTE:  If there are questions about the content of this report, please contact [address] [place] [address] by calling [phone]    FINDINGS:    RIGHT LOBE MEASUREMENTS:  5.0 x 1.6 x 1.4 cm (normal <  5 x 1.5 x 1.5 cm)  LEFT LOBE MEASUREMENTS:  4.8 x 1.3 x 1.1 cm (normal <  5 x 1.5 x 1.5 cm)  ADJACENT MASSES OR ENLARGED LYMPH NODES:  None    THYROID GLAND:    One or more subcentimeter thyroid nodule(s) which are smoothly marginated, not taller-than-wide, and with no echogenic foci, deemed to be clinically insignificant. There is a small round solid nodule in right lobe measuring 8 mm x 5 mm x 8 mm. Lesion is   slightly hyperechoic. In the posterior aspect of the lower pole right lobe there is heterogenous nodule measuring approximately 6 cm x 5 mm x 5 mm. NOTE: Recommendations are based on the TI-RADS 2017 [employer] of Radiology ultrasound-based risk stratification system to classify thyroid nodules that may warrant biopsy or sonographic follow-up    IMPRESSION    Diffuse small right thyroid nodules. No evidence of enlarged parathyroid nodule. RECOMMENDATION:   No FNA biopsy or imaging followup recommended   Status Results Details            Past Medical History:   Diagnosis Date    Hypertension      Patient Active Problem List    Diagnosis Date Noted    Postsurgical hypoparathyroidism ([facility] [address] 02/09/2021    Multinodular goiter 02/08/2021    Overweight (BMI 25.0-29.9) 02/08/2021    Primary hyperparathyroidism ([address] 02/07/2021    Status post parathyroidectomy ([address] 02/07/2021    Hypocalcemia 02/07/2021    Osteopenia 02/07/2021     History reviewed. No pertinent surgical history.   Family History   Problem Relation Age of Onset    Cancer Mother      Social History     Socioeconomic History    Marital status: Single     Spouse name: None    Number of children: None    Years of education: None    Highest education level: None   Occupational History    None   Tobacco Use    Smoking status: Current Every Day Smoker     Packs/day: 1.00     Years: 15.00     Pack years: 15.00     Types: Cigarettes    Smokeless tobacco: Never Used    Tobacco comment: some days smoking up to 2 packs    Vaping Use    Vaping Use: Never used   Substance and Sexual Activity    Alcohol use: Not Currently     Comment: socially    Drug use: Not Currently     Types: Marijuana Lilly Schumacher)    Sexual activity: Yes   Other Topics Concern    None   Social History Narrative    None     Social Determinants of Health     Financial Resource Strain:     Difficulty of Paying Living Expenses: Not on file   Food Insecurity:     Worried About Running Out of Food in the Last Year: Not on file    Vicki of Food in the Last Year: Not on file   Transportation Needs:     Lack of Transportation (Medical): Not on file    Lack of Transportation (Non-Medical):  Not on file   Physical Activity:     Days of Exercise per Week: Not on file    Minutes of Exercise per Session: Not on file   Stress:     Feeling of Stress : Not on file   Social Connections:     Frequency of Communication with Friends and Family: Not on file    Frequency of Social Gatherings with Friends and Family: Not on file    Attends Quaker Services: Not on file    Active Member of [address] [place] [address] or Organizations: Not on file    Attends Club or Organization Meetings: Not on file    Marital Status: Not on file   Intimate Partner Violence:     Fear of Current or Ex-Partner: Not on file    Emotionally Abused: Not on file    Physically Abused: Not on file    Sexually Abused: Not on file   Housing Stability:     Unable to Pay for Housing in the Last Year: Not on file    Number of [place] in the Last Year: Not on file    Unstable Housing in the Last Year: Not on file     Current Outpatient Medications   Medication Sig Dispense Refill    calcitRIOL (ROCALTROL) 0.25 MCG capsule TAKE ONE CAPSULE BY MOUTH DAILY 30 capsule 3    vitamin D3 (CHOLECALCIFEROL) 25 MCG (1000 UT) TABS tablet Take 2 tablets by mouth daily 100 tablet 0    Calcium Carbonate Antacid 1000 MG CHEW Take 2 tablets by mouth in the morning and at bedtime       lisinopril (PRINIVIL;ZESTRIL) 10 MG tablet Take 1 tablet by mouth daily 90 tablet 1    amLODIPine (NORVASC) 5 MG tablet Take 1 tablet by mouth daily 30 tablet 5    atorvastatin (LIPITOR) 40 MG tablet TAKE ONE TABLET BY MOUTH DAILY      loratadine (CLARITIN) 10 MG tablet Take 10 mg by mouth daily       No current facility-administered medications for this visit.      Allergies   Allergen Reactions    Methylene Blue Other (See Comments)     Pain in extremity of infusion \"throbbing, and it feels like a tourniquet is on my arm\"    Pcn [Penicillins] Hives     Family Status   Relation Name Status    Mother  (Not Specified)       Review of Systems:  Constitutional: no fatigue, no fever, no recent weight gain, no recent weight loss, no changes in appetite  Eyes: no eye pain, has change in vision, no eye redness, no eye irritation, no double vision  Ears, nose, throat: has nasal congestion, no sore throat, no earache, no decrease in hearing, no hoarseness, no dry mouth, has sinus problems, no difficulty swallowing, no neck lumps, no dental problems, no mouth sores, no ringing in ears  Pulmonary: no shortness of breath, no wheezing, no dyspnea on exertion, no cough  Cardiovascular: no chest pain, no lower extremity edema, no orthopnea, no intermittent leg claudication, has palpitations  Gastrointestinal: no abdominal pain, no nausea, no vomiting, no diarrhea, has constipation, no dysphagia, has heartburn, no bloating  Genitourinary: no dysuria, no urinary incontinence, no urinary hesitancy, no urinary frequency, no feelings of urinary urgency, no nocturia  Musculoskeletal: no joint swelling, no joint stiffness, has joint pain, has muscle cramps, has muscle pain, no bone pain  Integument/Breast: no hair loss, no skin rashes, no skin lesions, no itching, no dry skin  Neurological: has numbness, has tingling, no weakness, no confusion, has headaches, has dizziness, no fainting, no tremors, no decrease in memory, no balance problems  Psychiatric: no anxiety, no depression, no insomnia  Hematologic/Lymphatic: no tendency for easy bleeding, no swollen lymph nodes, no tendency for easy bruising  Immunology: has seasonal allergies, no frequent infections, no frequent illnesses  Endocrine: no temperature intolerance    /80   Pulse 80   Temp 98 °F (36.7 °C)   Resp 14   Ht 5' 7\" (1.702 m)   Wt 180 lb (81.6 kg)   LMP 07/07/2012   SpO2 98%   BMI 28.19 kg/m²    Wt Readings from Last 3 Encounters:   06/03/22 180 lb (81.6 kg)   04/29/22 179 lb (81.2 kg)   02/25/22 182 lb (82.6 kg)     Body mass index is 28.19 kg/m².     OBJECTIVE:  Constitutional: no acute distress, well appearing and well nourished  Psychiatric: oriented to person, place and time, judgement and insight and normal, recent and remote memory and intact and mood and affect are normal  Skin: skin and subcutaneous tissue is normal without mass, normal turgor  Head and Face: examination of head and face revealed no abnormalities  Eyes: no lid or conjunctival swelling, erythema or discharge, pupils are normal, equal, round, reactive to light  Ears/Nose: external inspection of ears and nose revealed no abnormalities, hearing is grossly normal  Oropharynx/Mouth/Face: lips, tongue and gums are normal with no lesions, the voice quality was normal  Neck: neck is supple and symmetric, with midline trachea and no masses, thyroid is enlarged  Lymphatics: normal cervical lymph nodes, normal supraclavicular nodes  Pulmonary: no increased work of breathing or signs of respiratory distress, lungs are clear to auscultation  Cardiovascular: normal heart rate and rhythm, normal S1 and S2, no murmurs and pedal pulses and 2+ bilaterally, No edema  Abdomen: abdomen is soft, non-tender with no masses  Musculoskeletal: normal gait and station and exam of the digits and nails are normal  Neurological: normal coordination and normal general cortical function      Lab Review:    Lab Results   Component Value Date    WBC 6.5 10/15/2021    HGB 13.5 10/15/2021    HCT 39.6 10/15/2021    MCV 93.1 10/15/2021  10/15/2021     Lab Results   Component Value Date     04/25/2022    K 4.1 04/25/2022     04/25/2022    CO2 24 04/25/2022    BUN 27 04/25/2022    CREATININE 0.9 04/25/2022    GLUCOSE 91 04/25/2022    CALCIUM 8.6 04/25/2022    PROT 7.3 04/25/2022    LABALBU 4.8 04/25/2022    BILITOT 0.6 04/25/2022    ALKPHOS 77 04/25/2022    AST 20 04/25/2022    ALT 16 04/25/2022    LABGLOM >60 04/25/2022    GFRAA >60 04/25/2022    AGRATIO 1.9 04/25/2022    GLOB 3.0 10/15/2021     Lab Results   Component Value Date    TSH 2.15 04/25/2022    FT3 2.9 04/25/2022     No results found for: LABA1C  No results found for: EAG  No results found for: CHOL  No results found for: TRIG  No results found for: HDL  No results found for: LDLCHOLESTEROL, LDLCALC  No results found for: LABVLDL, VLDL  No results found for: [facility]  Lab Results   Component Value Date    LABMICR Not Indicated 04/25/2022     Lab Results   Component Value Date    VITD25 40.9 04/25/2022        ASSESSMENT/PLAN:  1. Primary hyperparathyroidism ([facility] [address]  Call for thyroid sono results  Calcium 10.2-9.5-11.0-9.7  Uncontrolled postsurgical hypoparathyroidism  Parathyroidectomy 9/15/2020  - Comprehensive Metabolic Panel; Future  - Calcium Ionized Serum; Future  - PTH, Intact; Future  - Phosphorus; Future  - Magnesium; Future  - Vitamin D 25 Hydroxy; Future    2. Status post parathyroidectomy  Parathyroidectomy 9/15/2020  4 gland hyperplasia, gland reimplantation into left sternocleidomastoid muscle on 9/15/2020  - Comprehensive Metabolic Panel; Future  - Calcium Ionized Serum; Future  - PTH, Intact; Future  - Phosphorus; Future  - Magnesium; Future  - Vitamin D 25 Hydroxy; Future    3.  Hypocalcemia/postsurgical hypoparathyroidism  PTH <1.2-7-10-6.5-7.7  Calcium 9.5-11.0-9.7-9.5-8.6  25OHvitamin D 38.8-37.7-42.3-50.2-40.9  Uncontrolled postsurgical hypoparathyroidism  Calcitriol 0.25 mcg daily   Continue close monitoring  - calcitRIOL (ROCALTROL) 0.25 MCG capsule; Take 0.25 mcg by mouth daily  - magnesium 400 mg daily was disconnected because of diarrhea  - vitamin D3 (CHOLECALCIFEROL) 25 MCG (1000 UT) TABS tablet; Take 1 tablet by mouth daily   - Tums 2 tablets twice daily  - Vitamin D 25 Hydroxy; Future  - Calcium Ionized Serum; Future  - PTH, Intact; Future  - Phosphorus; Future  - Magnesium; Future  - Comprehensive Metabolic Panel; Future    4. Osteopenia, unspecified location  Has upset stomach, has ulcer history  Recommended Reclast  High fracture risk  6/3/2022  LUMBAR SPINE L1-L4:   T-score: -1.0   BMD in g/cm2: 0.939     LEFT HIP:   Neck T-score: -1.7   Neck BMD in g/cm2: 0.656   Total T-score: -1.1   Total BMD in g/cm2: 0.807     LEFT 33% RADIUS:   T-score: -1.5   BMD in g/cm2: 0.603     FRAX:   FRAX risk factor(s): None   Major osteoporotic fracture risk: 27%   Hip fracture risk:  3.4%   The FRAX calculation is based on patient answers to a questionnaire and can be associated with overestimation or underestimation of fracture risk. Therefore FRAX should be validated by the ordering physician. DEXA January 2020  Monitor bone density  OSTEOPENIA. T-scores are in the low bone density range, meeting WHO criteria for osteopenia. INTERVAL CHANGE: Not applicable   COMPARISON:  None   DXA machine: [employer] W (S/N [id number]). 100 [facility] in g/cm2 at 95% confidence: Lumbar spine NA, Total hip NA. FRAX software version: 3.08   LUMBAR SPINE:   T-score for the lumbar spine: -1.7   BMD for the lumbar spine in g/cm2: 0.861   HIP:   T-score for the femoral neck: -1.9   BMD for the femoral neck in g/cm2: 0.634   T-score for the total hip: -1.6   BMD for the total hip in g/cm2: 0.742     FRAX:   FRAX risk factor(s): Tobacco use   Major osteoporotic fracture risk (%): 9.7%   Hip fracture risk(%):  2.0%        - Comprehensive Metabolic Panel; Future  - Vitamin D 25 Hydroxy; Future    5.  Multinodular goiter  Small thyroid nodules  Continue monitoring  TSH 2. 27-1.9-3.19-2.33-2.15  -Thyroid sonogram  - T3, Free; Future  - T4, Free; Future  - TSH without Reflex; Future    6. Overweight (BMI 25.0-29. 9)  Diet, exercise    7. Chronic kidney disease stage 3  Creatinine 1.35-0.9  Decreased GFR 42->60  Feet cramps  Refer to Nephrologist    Reviewed and/or ordered clinical lab results Yes  Reviewed and/or ordered radiology tests Yes   Reviewed and/or ordered other diagnostic tests No  Discussed test results with performing physician No  Independently reviewed image, tracing, or specimen No  Made a decision to obtain old records No  Reviewed and summarized old records Yes   Calcium 11-10.8-11.1-10.7-10.8-10.7-7.9, upper limit normal 10.4  Elevated calcium since 2016  25 hydroxy vitamin D 23.7-37  PTH 90.7-81.80-6.08-6.73  TSH 2.15  SURGICAL PATHOLOGY REPORT    Name: Obdulio Ortiz  EPI#: [id number]  Acct#: [de-identified]    Case #: [id number]    Final Pathologic Diagnosis  A.  Possible left possible superior parathyroid, biopsy:  -     Parathyroid tissue, 0.003 g. Phoebe Raines left inferior parathyroid, excision:  -     Hypercellular parathyroid tissue,0.093 g.  -     Thymic tissue. Pheobe Raines right inferior parathyroid gland, excision:  -     Adipose tissue. D.  Possible right superior parathyroid, excision:  -     Hypercellular parathyroid tissue, 0.164 g.    E.  Possible right inferior parathyroid, excision:  -          Fragments of thyroid and parathyroid tissue, 0.091 g.    [facility]/9/16/2020 11:54:37  Electronically Signed Out            Angela Horn MD  Specimen(s) Received  A.  Possible left possible superior parathyroid  B.  Possible left inferior parathyroid  C.  Possible right inferior parathyroid gland  D.  Possible right superior parathyroid  E.  Possible right inferior parathyroid    Obtained history from other than patient No    Shady Tate was counseled regarding symptoms of primary hyperparathyroidism, postsurgical hypoparathyroidism, thyroid disease, multinodular goiter diagnosis, course and complications of disease if inadequately treated, side effects of medications, diagnosis, treatment options, and prognosis, risks, benefits, complications, and alternatives of treatment, labs, imaging and other studies and treatment targets and goals, signs and symptoms of hyperparathyroidism, treatment, side effects of medications, risk of cancer in thyroid nodules. She understands instructions and counseling. Total time I spent for this encounter 40 minutes    Return in about 3 months (around 9/3/2022) for thyroid problems, parathyroid.     Electronically signed by Loco Dennison MD on 6/3/2022 at 11:40 AM

## 2022-07-10 ENCOUNTER — TELEPHONE (OUTPATIENT)
Dept: ENDOCRINOLOGY | Age: 62
End: 2022-07-10

## 2022-07-11 ENCOUNTER — APPOINTMENT (OUTPATIENT)
Dept: GENERAL RADIOLOGY | Age: 62
End: 2022-07-11
Payer: COMMERCIAL

## 2022-07-11 ENCOUNTER — APPOINTMENT (OUTPATIENT)
Dept: CT IMAGING | Age: 62
End: 2022-07-11
Payer: COMMERCIAL

## 2022-07-11 ENCOUNTER — HOSPITAL ENCOUNTER (OUTPATIENT)
Age: 62
Setting detail: OBSERVATION
Discharge: HOME | End: 2022-07-13
Attending: INTERNAL MEDICINE | Admitting: INTERNAL MEDICINE
Payer: COMMERCIAL

## 2022-07-11 DIAGNOSIS — R07.9 ACUTE CHEST PAIN: Primary | ICD-10-CM

## 2022-07-11 DIAGNOSIS — I10 ACCELERATED HYPERTENSION: ICD-10-CM

## 2022-07-11 PROBLEM — Z72.0 TOBACCO ABUSE: Status: ACTIVE | Noted: 2022-07-11

## 2022-07-11 PROBLEM — K21.9 GERD (GASTROESOPHAGEAL REFLUX DISEASE): Status: ACTIVE | Noted: 2022-07-11

## 2022-07-11 LAB
A/G RATIO: 2 (ref 1.1–2.2)
ALBUMIN SERPL-MCNC: 4.8 G/DL (ref 3.4–5)
ALP BLD-CCNC: 75 U/L (ref 40–129)
ALT SERPL-CCNC: 20 U/L (ref 10–40)
ANION GAP SERPL CALCULATED.3IONS-SCNC: 9 MMOL/L (ref 3–16)
APTT: 31.4 SEC (ref 23–34.3)
AST SERPL-CCNC: 25 U/L (ref 15–37)
BASOPHILS ABSOLUTE: 0.1 K/UL (ref 0–0.2)
BASOPHILS RELATIVE PERCENT: 0.8 %
BILIRUB SERPL-MCNC: 0.8 MG/DL (ref 0–1)
BUN BLDV-MCNC: 24 MG/DL (ref 7–20)
CALCIUM SERPL-MCNC: 9.3 MG/DL (ref 8.3–10.6)
CHLORIDE BLD-SCNC: 106 MMOL/L (ref 99–110)
CO2: 24 MMOL/L (ref 21–32)
CREAT SERPL-MCNC: 0.9 MG/DL (ref 0.6–1.2)
EKG ATRIAL RATE: 77 BPM
EKG DIAGNOSIS: NORMAL
EKG P AXIS: 12 DEGREES
EKG P-R INTERVAL: 126 MS
EKG Q-T INTERVAL: 388 MS
EKG QRS DURATION: 68 MS
EKG QTC CALCULATION (BAZETT): 439 MS
EKG R AXIS: 1 DEGREES
EKG T AXIS: 0 DEGREES
EKG VENTRICULAR RATE: 77 BPM
EOSINOPHILS ABSOLUTE: 0.3 K/UL (ref 0–0.6)
EOSINOPHILS RELATIVE PERCENT: 3.7 %
GFR AFRICAN AMERICAN: >60
GFR NON-AFRICAN AMERICAN: >60
GLUCOSE BLD-MCNC: 105 MG/DL (ref 70–99)
HCT VFR BLD CALC: 40.7 % (ref 36–48)
HEMOGLOBIN: 13.7 G/DL (ref 12–16)
INR BLD: 0.98 (ref 0.87–1.14)
LIPASE: 48 U/L (ref 13–60)
LYMPHOCYTES ABSOLUTE: 1.6 K/UL (ref 1–5.1)
LYMPHOCYTES RELATIVE PERCENT: 20.4 %
MCH RBC QN AUTO: 30.9 PG (ref 26–34)
MCHC RBC AUTO-ENTMCNC: 33.6 G/DL (ref 31–36)
MCV RBC AUTO: 92.2 FL (ref 80–100)
MONOCYTES ABSOLUTE: 0.5 K/UL (ref 0–1.3)
MONOCYTES RELATIVE PERCENT: 6.7 %
NEUTROPHILS ABSOLUTE: 5.2 K/UL (ref 1.7–7.7)
NEUTROPHILS RELATIVE PERCENT: 68.4 %
PDW BLD-RTO: 14.3 % (ref 12.4–15.4)
PLATELET # BLD: 197 K/UL (ref 135–450)
PMV BLD AUTO: 8.7 FL (ref 5–10.5)
POTASSIUM SERPL-SCNC: 3.9 MMOL/L (ref 3.5–5.1)
PRO-BNP: 575 PG/ML (ref 0–124)
PROTHROMBIN TIME: 12.9 SEC (ref 11.7–14.5)
RBC # BLD: 4.42 M/UL (ref 4–5.2)
SODIUM BLD-SCNC: 139 MMOL/L (ref 136–145)
TOTAL PROTEIN: 7.2 G/DL (ref 6.4–8.2)
TROPONIN: <0.01 NG/ML
WBC # BLD: 7.7 K/UL (ref 4–11)

## 2022-07-11 PROCEDURE — 6370000000 HC RX 637 (ALT 250 FOR IP): Performed by: INTERNAL MEDICINE

## 2022-07-11 PROCEDURE — 93010 ELECTROCARDIOGRAM REPORT: CPT | Performed by: INTERNAL MEDICINE

## 2022-07-11 PROCEDURE — G0378 HOSPITAL OBSERVATION PER HR: HCPCS

## 2022-07-11 PROCEDURE — 6370000000 HC RX 637 (ALT 250 FOR IP): Performed by: PHYSICIAN ASSISTANT

## 2022-07-11 PROCEDURE — 6360000004 HC RX CONTRAST MEDICATION: Performed by: PHYSICIAN ASSISTANT

## 2022-07-11 PROCEDURE — 84484 ASSAY OF TROPONIN QUANT: CPT

## 2022-07-11 PROCEDURE — 85025 COMPLETE CBC W/AUTO DIFF WBC: CPT

## 2022-07-11 PROCEDURE — 6360000002 HC RX W HCPCS: Performed by: PHYSICIAN ASSISTANT

## 2022-07-11 PROCEDURE — 83690 ASSAY OF LIPASE: CPT

## 2022-07-11 PROCEDURE — 85610 PROTHROMBIN TIME: CPT

## 2022-07-11 PROCEDURE — 83880 ASSAY OF NATRIURETIC PEPTIDE: CPT

## 2022-07-11 PROCEDURE — 2580000003 HC RX 258: Performed by: INTERNAL MEDICINE

## 2022-07-11 PROCEDURE — 71045 X-RAY EXAM CHEST 1 VIEW: CPT

## 2022-07-11 PROCEDURE — 96374 THER/PROPH/DIAG INJ IV PUSH: CPT

## 2022-07-11 PROCEDURE — 71275 CT ANGIOGRAPHY CHEST: CPT

## 2022-07-11 PROCEDURE — 93005 ELECTROCARDIOGRAM TRACING: CPT | Performed by: INTERNAL MEDICINE

## 2022-07-11 PROCEDURE — 96375 TX/PRO/DX INJ NEW DRUG ADDON: CPT

## 2022-07-11 PROCEDURE — 6360000002 HC RX W HCPCS: Performed by: INTERNAL MEDICINE

## 2022-07-11 PROCEDURE — C9113 INJ PANTOPRAZOLE SODIUM, VIA: HCPCS | Performed by: INTERNAL MEDICINE

## 2022-07-11 PROCEDURE — 99285 EMERGENCY DEPT VISIT HI MDM: CPT

## 2022-07-11 PROCEDURE — 80053 COMPREHEN METABOLIC PANEL: CPT

## 2022-07-11 PROCEDURE — 85730 THROMBOPLASTIN TIME PARTIAL: CPT

## 2022-07-11 PROCEDURE — 96372 THER/PROPH/DIAG INJ SC/IM: CPT

## 2022-07-11 RX ORDER — ONDANSETRON 2 MG/ML
4 INJECTION INTRAMUSCULAR; INTRAVENOUS EVERY 6 HOURS PRN
Status: DISCONTINUED | OUTPATIENT
Start: 2022-07-11 | End: 2022-07-13 | Stop reason: HOSPADM

## 2022-07-11 RX ORDER — ATORVASTATIN CALCIUM 40 MG/1
1 TABLET, FILM COATED ORAL DAILY
Status: CANCELLED | OUTPATIENT
Start: 2022-07-11

## 2022-07-11 RX ORDER — ACETAMINOPHEN 650 MG/1
650 SUPPOSITORY RECTAL EVERY 6 HOURS PRN
Status: DISCONTINUED | OUTPATIENT
Start: 2022-07-11 | End: 2022-07-13 | Stop reason: HOSPADM

## 2022-07-11 RX ORDER — SODIUM CHLORIDE 0.9 % (FLUSH) 0.9 %
5-40 SYRINGE (ML) INJECTION EVERY 12 HOURS SCHEDULED
Status: DISCONTINUED | OUTPATIENT
Start: 2022-07-11 | End: 2022-07-13 | Stop reason: HOSPADM

## 2022-07-11 RX ORDER — ACETAMINOPHEN 325 MG/1
650 TABLET ORAL EVERY 6 HOURS PRN
Status: DISCONTINUED | OUTPATIENT
Start: 2022-07-11 | End: 2022-07-13 | Stop reason: HOSPADM

## 2022-07-11 RX ORDER — PANTOPRAZOLE SODIUM 40 MG/10ML
40 INJECTION, POWDER, LYOPHILIZED, FOR SOLUTION INTRAVENOUS 2 TIMES DAILY
Status: DISCONTINUED | OUTPATIENT
Start: 2022-07-11 | End: 2022-07-13 | Stop reason: HOSPADM

## 2022-07-11 RX ORDER — ENOXAPARIN SODIUM 100 MG/ML
40 INJECTION SUBCUTANEOUS NIGHTLY
Status: DISCONTINUED | OUTPATIENT
Start: 2022-07-11 | End: 2022-07-13 | Stop reason: HOSPADM

## 2022-07-11 RX ORDER — AMLODIPINE BESYLATE 5 MG/1
5 TABLET ORAL DAILY
Status: DISCONTINUED | OUTPATIENT
Start: 2022-07-12 | End: 2022-07-11

## 2022-07-11 RX ORDER — ATORVASTATIN CALCIUM 40 MG/1
40 TABLET, FILM COATED ORAL NIGHTLY
Status: DISCONTINUED | OUTPATIENT
Start: 2022-07-11 | End: 2022-07-13 | Stop reason: HOSPADM

## 2022-07-11 RX ORDER — HYDRALAZINE HYDROCHLORIDE 20 MG/ML
10 INJECTION INTRAMUSCULAR; INTRAVENOUS EVERY 4 HOURS PRN
Status: DISCONTINUED | OUTPATIENT
Start: 2022-07-11 | End: 2022-07-13 | Stop reason: HOSPADM

## 2022-07-11 RX ORDER — NITROGLYCERIN 0.4 MG/1
0.4 TABLET SUBLINGUAL EVERY 5 MIN PRN
Status: DISCONTINUED | OUTPATIENT
Start: 2022-07-11 | End: 2022-07-13 | Stop reason: HOSPADM

## 2022-07-11 RX ORDER — POLYETHYLENE GLYCOL 3350 17 G/17G
17 POWDER, FOR SOLUTION ORAL DAILY PRN
Status: DISCONTINUED | OUTPATIENT
Start: 2022-07-11 | End: 2022-07-13 | Stop reason: HOSPADM

## 2022-07-11 RX ORDER — VITAMIN B COMPLEX
2000 TABLET ORAL DAILY
Status: DISCONTINUED | OUTPATIENT
Start: 2022-07-12 | End: 2022-07-13 | Stop reason: HOSPADM

## 2022-07-11 RX ORDER — CALCITRIOL 0.25 UG/1
0.25 CAPSULE, LIQUID FILLED ORAL DAILY
Status: DISCONTINUED | OUTPATIENT
Start: 2022-07-12 | End: 2022-07-13 | Stop reason: HOSPADM

## 2022-07-11 RX ORDER — LISINOPRIL 10 MG/1
10 TABLET ORAL DAILY
Status: DISCONTINUED | OUTPATIENT
Start: 2022-07-12 | End: 2022-07-13 | Stop reason: HOSPADM

## 2022-07-11 RX ORDER — MORPHINE SULFATE 2 MG/ML
2 INJECTION, SOLUTION INTRAMUSCULAR; INTRAVENOUS EVERY 4 HOURS PRN
Status: DISCONTINUED | OUTPATIENT
Start: 2022-07-11 | End: 2022-07-13 | Stop reason: HOSPADM

## 2022-07-11 RX ORDER — ASPIRIN 81 MG/1
324 TABLET, CHEWABLE ORAL ONCE
Status: COMPLETED | OUTPATIENT
Start: 2022-07-11 | End: 2022-07-11

## 2022-07-11 RX ORDER — SODIUM CHLORIDE 9 MG/ML
INJECTION, SOLUTION INTRAVENOUS CONTINUOUS
Status: DISCONTINUED | OUTPATIENT
Start: 2022-07-11 | End: 2022-07-13 | Stop reason: HOSPADM

## 2022-07-11 RX ORDER — AMLODIPINE BESYLATE 5 MG/1
5 TABLET ORAL NIGHTLY
Status: DISCONTINUED | OUTPATIENT
Start: 2022-07-11 | End: 2022-07-13 | Stop reason: HOSPADM

## 2022-07-11 RX ORDER — ASPIRIN 81 MG/1
81 TABLET, CHEWABLE ORAL DAILY
Status: DISCONTINUED | OUTPATIENT
Start: 2022-07-12 | End: 2022-07-13 | Stop reason: HOSPADM

## 2022-07-11 RX ORDER — CETIRIZINE HYDROCHLORIDE 10 MG/1
10 TABLET ORAL DAILY
Status: DISCONTINUED | OUTPATIENT
Start: 2022-07-12 | End: 2022-07-13 | Stop reason: HOSPADM

## 2022-07-11 RX ORDER — SUCRALFATE 1 G/1
1 TABLET ORAL EVERY 6 HOURS SCHEDULED
Status: DISCONTINUED | OUTPATIENT
Start: 2022-07-11 | End: 2022-07-13 | Stop reason: HOSPADM

## 2022-07-11 RX ORDER — SODIUM CHLORIDE 0.9 % (FLUSH) 0.9 %
5-40 SYRINGE (ML) INJECTION PRN
Status: DISCONTINUED | OUTPATIENT
Start: 2022-07-11 | End: 2022-07-13 | Stop reason: HOSPADM

## 2022-07-11 RX ORDER — HYDROMORPHONE HYDROCHLORIDE 1 MG/ML
1 INJECTION, SOLUTION INTRAMUSCULAR; INTRAVENOUS; SUBCUTANEOUS ONCE
Status: COMPLETED | OUTPATIENT
Start: 2022-07-11 | End: 2022-07-11

## 2022-07-11 RX ORDER — MAGNESIUM HYDROXIDE/ALUMINUM HYDROXICE/SIMETHICONE 120; 1200; 1200 MG/30ML; MG/30ML; MG/30ML
30 SUSPENSION ORAL EVERY 6 HOURS PRN
Status: DISCONTINUED | OUTPATIENT
Start: 2022-07-11 | End: 2022-07-13 | Stop reason: HOSPADM

## 2022-07-11 RX ORDER — SODIUM CHLORIDE 9 MG/ML
INJECTION, SOLUTION INTRAVENOUS PRN
Status: DISCONTINUED | OUTPATIENT
Start: 2022-07-11 | End: 2022-07-13 | Stop reason: HOSPADM

## 2022-07-11 RX ORDER — ONDANSETRON 4 MG/1
4 TABLET, ORALLY DISINTEGRATING ORAL EVERY 8 HOURS PRN
Status: DISCONTINUED | OUTPATIENT
Start: 2022-07-11 | End: 2022-07-13 | Stop reason: HOSPADM

## 2022-07-11 RX ADMIN — ACETAMINOPHEN 325MG 650 MG: 325 TABLET ORAL at 22:33

## 2022-07-11 RX ADMIN — SODIUM CHLORIDE: 9 INJECTION, SOLUTION INTRAVENOUS at 22:25

## 2022-07-11 RX ADMIN — SODIUM CHLORIDE, PRESERVATIVE FREE 10 ML: 5 INJECTION INTRAVENOUS at 22:11

## 2022-07-11 RX ADMIN — PANTOPRAZOLE SODIUM 40 MG: 40 INJECTION, POWDER, FOR SOLUTION INTRAVENOUS at 22:10

## 2022-07-11 RX ADMIN — ATORVASTATIN CALCIUM 40 MG: 40 TABLET, FILM COATED ORAL at 22:11

## 2022-07-11 RX ADMIN — HYDROMORPHONE HYDROCHLORIDE 1 MG: 1 INJECTION, SOLUTION INTRAMUSCULAR; INTRAVENOUS; SUBCUTANEOUS at 13:26

## 2022-07-11 RX ADMIN — NITROGLYCERIN 1 INCH: 20 OINTMENT TOPICAL at 13:25

## 2022-07-11 RX ADMIN — ASPIRIN 324 MG: 81 TABLET, CHEWABLE ORAL at 13:24

## 2022-07-11 RX ADMIN — AMLODIPINE BESYLATE 5 MG: 5 TABLET ORAL at 22:33

## 2022-07-11 RX ADMIN — SUCRALFATE 1 G: 1 TABLET ORAL at 22:11

## 2022-07-11 RX ADMIN — ENOXAPARIN SODIUM 40 MG: 100 INJECTION SUBCUTANEOUS at 22:11

## 2022-07-11 RX ADMIN — IOPAMIDOL 75 ML: 755 INJECTION, SOLUTION INTRAVENOUS at 12:08

## 2022-07-11 ASSESSMENT — PAIN SCALES - GENERAL
PAINLEVEL_OUTOF10: 6
PAINLEVEL_OUTOF10: 3
PAINLEVEL_OUTOF10: 9
PAINLEVEL_OUTOF10: 10

## 2022-07-11 ASSESSMENT — PAIN - FUNCTIONAL ASSESSMENT: PAIN_FUNCTIONAL_ASSESSMENT: 0-10

## 2022-07-11 ASSESSMENT — ENCOUNTER SYMPTOMS
ABDOMINAL PAIN: 0
SHORTNESS OF BREATH: 1
STRIDOR: 0
NAUSEA: 0
DIARRHEA: 0
VOMITING: 0
BACK PAIN: 1
CONSTIPATION: 0
COUGH: 0
WHEEZING: 0
COLOR CHANGE: 0

## 2022-07-11 ASSESSMENT — PAIN DESCRIPTION - DESCRIPTORS
DESCRIPTORS: PRESSURE
DESCRIPTORS: PRESSURE

## 2022-07-11 ASSESSMENT — PAIN DESCRIPTION - LOCATION
LOCATION: BACK;CHEST
LOCATION: HEAD
LOCATION: BACK;SCROTUM
LOCATION: CHEST

## 2022-07-11 ASSESSMENT — HEART SCORE: ECG: 1

## 2022-07-11 NOTE — ED PROVIDER NOTES
[address] [facility]        Pt Name: Ernst Arce  MRN: [id number]  [place] 1960  Date of evaluation: 7/11/2022  Provider: Tamiko Alston PA-C  PCP: Demarcus WILLS  Note Started: 11:10 AM EDT       JC. I have evaluated this patient. My supervising physician was available for consultation. CHIEF COMPLAINT       Chief Complaint   Patient presents with    Chest Pain     Pt reports pressure like CP to mid chest that radiates through back since weekend, now more constant       HISTORY OF PRESENT ILLNESS   (Location, Timing/Onset, Context/Setting, Quality, Duration, Modifying Factors, Severity, Associated Signs and Symptoms)  Note limiting factors. Chief Complaint: Chest pressure    Ernst Arce is a 59 y.o. female who presents to the emergency department complaining of midsternal chest pressure with radiation to the back. Pain started this weekend but was intermittent until this morning. It has been constant. Describes it as someone sitting on the chest.  It does radiate into the neck as well. Denies sharp stabbing or pulsatile pain. Reports feeling mildly short of breath. Denies cough, hemoptysis, palpitations, abdominal pain, nausea, vomiting, diaphoresis, headache, dizziness, pain or swelling in extremities. Nursing Notes were all reviewed and agreed with or any disagreements were addressed in the HPI. REVIEW OF SYSTEMS    (2-9 systems for level 4, 10 or more for level 5)     Review of Systems   Constitutional: Negative for chills and fever. HENT: Negative. Eyes: Negative for visual disturbance. Respiratory: Positive for shortness of breath. Negative for cough, wheezing and stridor. Cardiovascular: Positive for chest pain. Negative for palpitations and leg swelling. Gastrointestinal: Negative for abdominal pain, constipation, diarrhea, nausea and vomiting. Genitourinary: Negative. Musculoskeletal: Positive for back pain. Negative for neck pain and neck stiffness. Skin: Negative for color change, pallor, rash and wound. Neurological: Negative. Psychiatric/Behavioral: Negative for confusion. All other systems reviewed and are negative. Positives and Pertinent negatives as per HPI. Except as noted above in the ROS, all other systems were reviewed and negative. PAST MEDICAL HISTORY     Past Medical History:   Diagnosis Date    Hypertension          SURGICAL HISTORY   History reviewed. No pertinent surgical history.       CURRENTMEDICATIONS       Previous Medications    AMLODIPINE (NORVASC) 5 MG TABLET    Take 1 tablet by mouth daily    ATORVASTATIN (LIPITOR) 40 MG TABLET    TAKE ONE TABLET BY MOUTH DAILY    CALCITRIOL (ROCALTROL) 0.25 MCG CAPSULE    TAKE ONE CAPSULE BY MOUTH DAILY    CALCIUM CARBONATE ANTACID 1000 MG CHEW    Take 2 tablets by mouth in the morning and at bedtime     LISINOPRIL (PRINIVIL;ZESTRIL) 10 MG TABLET    Take 1 tablet by mouth daily    LORATADINE (CLARITIN) 10 MG TABLET    Take 10 mg by mouth daily    VITAMIN D3 (CHOLECALCIFEROL) 25 MCG (1000 UT) TABS TABLET    Take 2 tablets by mouth daily         ALLERGIES     Methylene blue and Pcn [penicillins]    FAMILYHISTORY       Family History   Problem Relation Age of Onset    Cancer Mother           SOCIAL HISTORY       Social History     Tobacco Use    Smoking status: Current Every Day Smoker     Packs/day: 1.00     Years: 15.00     Pack years: 15.00     Types: Cigarettes    Smokeless tobacco: Never Used    Tobacco comment: some days smoking up to 2 packs    Vaping Use    Vaping Use: Never used   Substance Use Topics    Alcohol use: Not Currently     Comment: socially    Drug use: Not Currently     Types: Marijuana (Weed)       SCREENINGS    Harley Coma Scale  Eye Opening: Spontaneous  Best Verbal Response: Oriented  Best Motor Response: Obeys commands  Tyrone Coma Scale Score: 15 Heart Score for chest pain patients  History: Highly Suspicious  ECG: Non-Specifc repolarization disturbance/LBTB/PM  Patient Age: > 39 and < 65 years  *Risk factors for Atherosclerotic disease: Hypertension,Positive family History,Cigarette smoking  Risk Factors: > 3 Risk factors or history of atherosclerotic disease*  Troponin: < 1X normal limit  Heart Score Total: 6      PHYSICAL EXAM    (up to 7 for level 4, 8 or more for level 5)     ED Triage Vitals [07/11/22 1053]   BP Temp Temp Source Heart Rate Resp SpO2 Height Weight   (!) 190/114 97.9 °F (36.6 °C) Oral 73 18 99 % -- --       Physical Exam  Vitals and nursing note reviewed. Constitutional:       Appearance: Normal appearance. She is well-developed. She is not toxic-appearing or diaphoretic. HENT:      Head: Normocephalic and atraumatic. Right Ear: External ear normal.      Left Ear: External ear normal.      Nose: Nose normal.      Mouth/Throat:      Mouth: Mucous membranes are moist.      Pharynx: Oropharynx is clear. Eyes:      General: No scleral icterus. Right eye: No discharge. Left eye: No discharge. Extraocular Movements: Extraocular movements intact. Conjunctiva/sclera: Conjunctivae normal.      Pupils: Pupils are equal, round, and reactive to light. Neck:      Trachea: Trachea and phonation normal.      Meningeal: Brudzinski's sign and Kernig's sign absent. Cardiovascular:      Rate and Rhythm: Normal rate. Pulmonary:      Effort: Pulmonary effort is normal.      Breath sounds: Normal breath sounds. Abdominal:      General: Bowel sounds are normal. There is no abdominal bruit. Palpations: Abdomen is soft. There is no pulsatile mass. Tenderness: There is no abdominal tenderness. There is no right CVA tenderness, left CVA tenderness, guarding or rebound. Negative signs include Encarnacion's sign, Rovsing's sign, McBurney's sign, psoas sign and obturator sign.    Musculoskeletal:         General: Normal range of motion. Cervical back: Full passive range of motion without pain, normal range of motion and neck supple. Comments: No acute extremity edema, posterior calf or thigh tenderness, palpable cord, discoloration, mottling. Negative homans. Lymphadenopathy:      Cervical: No cervical adenopathy. Skin:     General: Skin is warm and dry. Coloration: Skin is not jaundiced or pale. Findings: No bruising, erythema, lesion or rash. Neurological:      General: No focal deficit present. Mental Status: She is alert and oriented to person, place, and time. Psychiatric:         Behavior: Behavior normal.         DIAGNOSTIC RESULTS   LABS:    Labs Reviewed   COMPREHENSIVE METABOLIC PANEL - Abnormal; Notable for the following components:       Result Value    Glucose 105 (*)     BUN 24 (*)     All other components within normal limits   BRAIN NATRIURETIC PEPTIDE - Abnormal; Notable for the following components:    Pro- (*)     All other components within normal limits   CBC WITH AUTO DIFFERENTIAL   LIPASE   PROTIME-INR   APTT   TROPONIN       When ordered only abnormal lab results are displayed. All other labs were within normal range or not returned as of this dictation. EKG: When ordered, EKG's are interpreted by the Emergency Department Physician in the absence of a cardiologist.  Please see their note for interpretation of EKG. RADIOLOGY:   Non-plain film images such as CT, Ultrasound and MRI are read by the radiologist. Plain radiographic images are visualized and preliminarily interpreted by the ED Provider with the below findings:        Interpretation per the Radiologist below, if available at the time of this note:    CTA CHEST W WO CONTRAST   Final Result   No CT evidence of aortic dissection. XR CHEST PORTABLE   Final Result   No acute cardiopulmonary disease.                  PROCEDURES   Unless otherwise noted below, none     Procedures    CRITICAL CARE TIME n/a    CONSULTS:  IP CONSULT TO HOSPITALIST      EMERGENCY DEPARTMENT COURSE and DIFFERENTIAL DIAGNOSIS/MDM:   Vitals:    Vitals:    07/11/22 1053 07/11/22 1349   BP: (!) 190/114 (!) 150/94   Pulse: 73    Resp: 18    Temp: 97.9 °F (36.6 °C)    TempSrc: Oral    SpO2: 99%        Patient was given the following medications:  Medications   HYDROmorphone HCl PF (DILAUDID) injection 1 mg (1 mg IntraVENous Given 7/11/22 1326)   iopamidol (ISOVUE-370) 76 % injection 75 mL (75 mLs IntraVENous Given 7/11/22 1208)   aspirin chewable tablet 324 mg (324 mg Oral Given 7/11/22 1324)   nitroglycerin (NITRO-BID) 2 % ointment 1 inch (1 inch Topical Given 7/11/22 1325)         Is this patient to be included in the SEP-1 Core Measure due to severe sepsis or septic shock? No   Exclusion criteria - the patient is NOT to be included for SEP-1 Core Measure due to: Infection is not suspected    This patient presents complaining of pressure-like chest pain radiating to the back and the neck. Does have risk factors. Heart score 6. Has no recent cardiac evaluation. Scan of the chest appear stable at this time. Aspirin, pain medicine and nitroglycerin ordered. Patient understands and agrees with plan for admission for cardiac evaluation. FINAL IMPRESSION      1. Acute chest pain    2. Accelerated hypertension          DISPOSITION/PLAN   DISPOSITION Decision To Admit 07/11/2022 12:58:58 PM      PATIENT REFERRED TO:  No follow-up provider specified.     DISCHARGE MEDICATIONS:  New Prescriptions    No medications on file       DISCONTINUED MEDICATIONS:  Discontinued Medications    No medications on file              (Please note that portions of this note were completed with a voice recognition program.  Efforts were made to edit the dictations but occasionally words are mis-transcribed.)    Hector Mitchell PA-C (electronically signed)           Hector Mitchell PA-C  07/11/22 9684

## 2022-07-11 NOTE — ACP (ADVANCE CARE PLANNING)
Advanced Care Planning Note. Purpose of Encounter: Advanced care planning in light of chest pain  Parties In Attendance: Patient  Decisional Capacity: Yes  Subjective: Patient with chest pain  Objective: Cr 0.9  Goals of Care Determination: Patient wants full support (CPR, vent, surgery, HD, trach, PEG)  Plan:  SPECT. Echo  Code Status: Full code  Time spent on Advanced care Plannin minutes  Advanced Care Planning Documents: Completed advanced directives on chart, son is the POA.     Deborah Marin MD  2022 4:33 PM

## 2022-07-11 NOTE — H&P
HOSPITALISTS HISTORY AND PHYSICAL    7/11/2022 4:25 PM    Patient Information:  Ld Pereira is a 59 y.o. female [id number]  PCP:  Lenore WILLS (Tel: [phone] )    Chief complaint:    Chief Complaint   Patient presents with    Chest Pain     Pt reports pressure like CP to mid chest that radiates through back since weekend, now more constant       History of Present Illness:  Carly Painter is a 59 y.o. female with history of tobacco abuse, primary hyperparathyroidism, h/o kidney stones on Tums, GERD, HTN, multinodular goiter came to ER with complaints of chest pain. States she has substernal chest pressure that began on Saturday. Persistent. Associated with SOB. States she has worsening GAINES, cannot walk up a flight of stairs now. No BL LE edema or orthopnea. Smokes 1 1/2 ppd. Occasional EtOH. Thought CP might be GERD. Worsened CP today and came to ER. Radiating to back and constant. No radiation to arm or neck. No HA, dizziness, LH, palpations or syncope. No dysphagia, odynophagia, abdominal pain, melena or hematochezia. No fevers, chills or NS. No pleurisy or hemoptysis. Otherwise complete ROS is negative unless listed above. REVIEW OF SYSTEMS:   Pertinent positives as noted in HPI. All other systems were reviewed and are negative. Past Medical History:   has a past medical history of Hypertension. Past Surgical History:   has no past surgical history on file. Medications:  No current facility-administered medications on file prior to encounter.      Current Outpatient Medications on File Prior to Encounter   Medication Sig Dispense Refill    calcitRIOL (ROCALTROL) 0.25 MCG capsule TAKE ONE CAPSULE BY MOUTH DAILY 30 capsule 3    vitamin D3 (CHOLECALCIFEROL) 25 MCG (1000 UT) TABS tablet Take 2 tablets by mouth daily 100 tablet 0    Calcium Carbonate Antacid 1000 MG CHEW Take 2 tablets by mouth in the morning and at bedtime       lisinopril (PRINIVIL;ZESTRIL) 10 MG tablet Take 1 tablet by mouth daily 90 tablet 1    amLODIPine (NORVASC) 5 MG tablet Take 1 tablet by mouth daily 30 tablet 5    atorvastatin (LIPITOR) 40 MG tablet TAKE ONE TABLET BY MOUTH DAILY      loratadine (CLARITIN) 10 MG tablet Take 10 mg by mouth daily         Allergies: Allergies   Allergen Reactions    Methylene Blue Other (See Comments)     Pain in extremity of infusion \"throbbing, and it feels like a tourniquet is on my arm\"    Pcn [Penicillins] Hives        Social History:  Patient Lives with boyfriend   reports that she has been smoking cigarettes. She has a 15.00 pack-year smoking history. She has never used smokeless tobacco. She reports previous alcohol use. She reports previous drug use. Drug: Marijuana Corinne Oropeza. Family History:  family history includes Cancer in her mother. Physical Exam:  BP (!) 97/58   Pulse 63   Temp 97.9 °F (36.6 °C) (Oral)   Resp 17   LMP 07/07/2012   SpO2 99%     General appearance:  Appears comfortable. Well nourished  Eyes: Sclera clear, pupils equal  ENT: Moist mucus membranes, no thrush. Trachea midline. Cardiovascular: Regular rhythm, normal S1, S2. No murmur, gallop, rub. No edema in lower extremities  Respiratory: Clear to auscultation bilaterally, no wheeze, good inspiratory effort  Gastrointestinal: Abdomen soft, non-tender, not distended, normal bowel sounds  Musculoskeletal: No cyanosis in digits, neck supple  Neurology: Cranial nerves grossly intact. Alert and oriented in time, place and person. No speech or motor deficits  Psychiatry: Anxious affect.  Not agitated  Skin: Warm, dry, normal turgor, no rash  Brisk capillary refill, peripheral pulses palpable   Labs:  CBC:   Lab Results   Component Value Date/Time    WBC 7.7 07/11/2022 11:03 AM    RBC 4.42 07/11/2022 11:03 AM    HGB 13.7 07/11/2022 11:03 AM    HCT 40.7 07/11/2022 11:03 AM    MCV 92.2 07/11/2022 11:03 AM    MCH 30.9 07/11/2022 11:03 AM    MCHC 33.6 07/11/2022 11:03 AM    RDW 14.3 07/11/2022 11:03 AM     07/11/2022 11:03 AM    MPV 8.7 07/11/2022 11:03 AM     BMP:    Lab Results   Component Value Date/Time     07/11/2022 11:03 AM    K 3.9 07/11/2022 11:03 AM     07/11/2022 11:03 AM    CO2 24 07/11/2022 11:03 AM    BUN 24 07/11/2022 11:03 AM    CREATININE 0.9 07/11/2022 11:03 AM    CALCIUM 9.3 07/11/2022 11:03 AM    GFRAA >60 07/11/2022 11:03 AM    LABGLOM >60 07/11/2022 11:03 AM    GLUCOSE 105 07/11/2022 11:03 AM     CTA CHEST W WO CONTRAST   Final Result   No CT evidence of aortic dissection. XR CHEST PORTABLE   Final Result   No acute cardiopulmonary disease. Problem List  Principal Problem:    Chest pain  Active Problems:    Tobacco abuse    GERD (gastroesophageal reflux disease)    HTN (hypertension)    Primary hyperparathyroidism (HCC)    Multinodular goiter    Overweight (BMI 25.0-29. 9)  Resolved Problems:    * No resolved hospital problems. *        Assessment/Plan:   1. SPECT in AM  2. Echo for CP  3. Serial troponin  4. Remove nitropaste, use NTG PRN  5. Start Protonix 40 mg IV bid  6. Start Carafate  7. ASA started  8. Continue Lipitor and Lisinopril  9. Maalox PRN  10. Hydralazine IV PRN  11. Counseled to stop smoking  12. Telemetry  13. Morphine IV PRN      DVT prophylaxis Lovenox  Code status Full code  Diet General, NPO p MN  IV access Peripheral   Quintanilla Catheter No    Place in Observation. I anticipate hospitalization spanning less than two midnights for investigation and treatment of the above medically necessary diagnoses. Discussed with patient. Will see what work up shows.       David Curry MD    7/11/2022 4:25 PM

## 2022-07-12 ENCOUNTER — APPOINTMENT (OUTPATIENT)
Dept: ULTRASOUND IMAGING | Age: 62
End: 2022-07-12
Payer: COMMERCIAL

## 2022-07-12 LAB
ANION GAP SERPL CALCULATED.3IONS-SCNC: 10 MMOL/L (ref 3–16)
BASOPHILS ABSOLUTE: 0.1 K/UL (ref 0–0.2)
BASOPHILS RELATIVE PERCENT: 1.1 %
BUN BLDV-MCNC: 16 MG/DL (ref 7–20)
CALCIUM SERPL-MCNC: 8.2 MG/DL (ref 8.3–10.6)
CHLORIDE BLD-SCNC: 107 MMOL/L (ref 99–110)
CO2: 24 MMOL/L (ref 21–32)
CREAT SERPL-MCNC: 0.7 MG/DL (ref 0.6–1.2)
EOSINOPHILS ABSOLUTE: 0.4 K/UL (ref 0–0.6)
EOSINOPHILS RELATIVE PERCENT: 5.5 %
GFR AFRICAN AMERICAN: >60
GFR NON-AFRICAN AMERICAN: >60
GLUCOSE BLD-MCNC: 92 MG/DL (ref 70–99)
HCT VFR BLD CALC: 40.9 % (ref 36–48)
HEMOGLOBIN: 13.5 G/DL (ref 12–16)
LV EF: 68 %
LV EF: 73 %
LVEF MODALITY: NORMAL
LVEF MODALITY: NORMAL
LYMPHOCYTES ABSOLUTE: 2 K/UL (ref 1–5.1)
LYMPHOCYTES RELATIVE PERCENT: 27.7 %
MCH RBC QN AUTO: 31.2 PG (ref 26–34)
MCHC RBC AUTO-ENTMCNC: 33.1 G/DL (ref 31–36)
MCV RBC AUTO: 94.3 FL (ref 80–100)
MONOCYTES ABSOLUTE: 0.7 K/UL (ref 0–1.3)
MONOCYTES RELATIVE PERCENT: 10.3 %
NEUTROPHILS ABSOLUTE: 3.9 K/UL (ref 1.7–7.7)
NEUTROPHILS RELATIVE PERCENT: 55.4 %
PDW BLD-RTO: 14.2 % (ref 12.4–15.4)
PLATELET # BLD: 184 K/UL (ref 135–450)
PMV BLD AUTO: 9 FL (ref 5–10.5)
POTASSIUM REFLEX MAGNESIUM: 4.7 MMOL/L (ref 3.5–5.1)
RBC # BLD: 4.34 M/UL (ref 4–5.2)
SARS-COV-2, NAAT: NOT DETECTED
SODIUM BLD-SCNC: 141 MMOL/L (ref 136–145)
TROPONIN: <0.01 NG/ML
WBC # BLD: 7.1 K/UL (ref 4–11)

## 2022-07-12 PROCEDURE — C9113 INJ PANTOPRAZOLE SODIUM, VIA: HCPCS | Performed by: INTERNAL MEDICINE

## 2022-07-12 PROCEDURE — 96372 THER/PROPH/DIAG INJ SC/IM: CPT

## 2022-07-12 PROCEDURE — 3430000000 HC RX DIAGNOSTIC RADIOPHARMACEUTICAL: Performed by: INTERNAL MEDICINE

## 2022-07-12 PROCEDURE — 93306 TTE W/DOPPLER COMPLETE: CPT

## 2022-07-12 PROCEDURE — 84484 ASSAY OF TROPONIN QUANT: CPT

## 2022-07-12 PROCEDURE — 85025 COMPLETE CBC W/AUTO DIFF WBC: CPT

## 2022-07-12 PROCEDURE — 6370000000 HC RX 637 (ALT 250 FOR IP): Performed by: INTERNAL MEDICINE

## 2022-07-12 PROCEDURE — A9502 TC99M TETROFOSMIN: HCPCS | Performed by: INTERNAL MEDICINE

## 2022-07-12 PROCEDURE — G0378 HOSPITAL OBSERVATION PER HR: HCPCS

## 2022-07-12 PROCEDURE — 78452 HT MUSCLE IMAGE SPECT MULT: CPT

## 2022-07-12 PROCEDURE — 96376 TX/PRO/DX INJ SAME DRUG ADON: CPT

## 2022-07-12 PROCEDURE — 36415 COLL VENOUS BLD VENIPUNCTURE: CPT

## 2022-07-12 PROCEDURE — 2580000003 HC RX 258: Performed by: INTERNAL MEDICINE

## 2022-07-12 PROCEDURE — 76705 ECHO EXAM OF ABDOMEN: CPT

## 2022-07-12 PROCEDURE — 93017 CV STRESS TEST TRACING ONLY: CPT | Performed by: INTERNAL MEDICINE

## 2022-07-12 PROCEDURE — 87635 SARS-COV-2 COVID-19 AMP PRB: CPT

## 2022-07-12 PROCEDURE — 6360000002 HC RX W HCPCS: Performed by: INTERNAL MEDICINE

## 2022-07-12 PROCEDURE — 80048 BASIC METABOLIC PNL TOTAL CA: CPT

## 2022-07-12 RX ORDER — CALCIUM CARBONATE 200(500)MG
1000 TABLET,CHEWABLE ORAL 2 TIMES DAILY
Status: DISCONTINUED | OUTPATIENT
Start: 2022-07-12 | End: 2022-07-13 | Stop reason: HOSPADM

## 2022-07-12 RX ADMIN — CETIRIZINE HYDROCHLORIDE 10 MG: 10 TABLET, FILM COATED ORAL at 11:42

## 2022-07-12 RX ADMIN — ATORVASTATIN CALCIUM 40 MG: 40 TABLET, FILM COATED ORAL at 20:12

## 2022-07-12 RX ADMIN — ANTACID TABLETS 1000 MG: 500 TABLET, CHEWABLE ORAL at 20:12

## 2022-07-12 RX ADMIN — LISINOPRIL 10 MG: 10 TABLET ORAL at 11:42

## 2022-07-12 RX ADMIN — SUCRALFATE 1 G: 1 TABLET ORAL at 05:57

## 2022-07-12 RX ADMIN — SUCRALFATE 1 G: 1 TABLET ORAL at 17:41

## 2022-07-12 RX ADMIN — TETROFOSMIN 10 MILLICURIE: 1.38 INJECTION, POWDER, LYOPHILIZED, FOR SOLUTION INTRAVENOUS at 08:14

## 2022-07-12 RX ADMIN — PANTOPRAZOLE SODIUM 40 MG: 40 INJECTION, POWDER, FOR SOLUTION INTRAVENOUS at 20:13

## 2022-07-12 RX ADMIN — PANTOPRAZOLE SODIUM 40 MG: 40 INJECTION, POWDER, FOR SOLUTION INTRAVENOUS at 11:42

## 2022-07-12 RX ADMIN — SODIUM CHLORIDE: 9 INJECTION, SOLUTION INTRAVENOUS at 20:23

## 2022-07-12 RX ADMIN — Medication 2000 UNITS: at 11:42

## 2022-07-12 RX ADMIN — SODIUM CHLORIDE, PRESERVATIVE FREE 10 ML: 5 INJECTION INTRAVENOUS at 20:13

## 2022-07-12 RX ADMIN — CALCITRIOL 0.25 MCG: 0.25 CAPSULE ORAL at 11:42

## 2022-07-12 RX ADMIN — ENOXAPARIN SODIUM 40 MG: 100 INJECTION SUBCUTANEOUS at 20:13

## 2022-07-12 RX ADMIN — SODIUM CHLORIDE, PRESERVATIVE FREE 10 ML: 5 INJECTION INTRAVENOUS at 11:44

## 2022-07-12 RX ADMIN — ANTACID TABLETS 1000 MG: 500 TABLET, CHEWABLE ORAL at 11:42

## 2022-07-12 RX ADMIN — TETROFOSMIN 30 MILLICURIE: 1.38 INJECTION, POWDER, LYOPHILIZED, FOR SOLUTION INTRAVENOUS at 09:28

## 2022-07-12 ASSESSMENT — PAIN SCALES - GENERAL: PAINLEVEL_OUTOF10: 3

## 2022-07-12 NOTE — PROGRESS NOTES
Patient instructed on Wilmer Protocol Stress Test Procedure including possible side effects and adverse reactions. Verbalizes knowledge and understanding and denies having any questions.

## 2022-07-12 NOTE — CARE COORDINATION
Patient admitted as Observation/OPIB with an anticipated short hospitalization length of stay. Chart reviewed and it appears that patient has minimal needs for discharge at this time. Discussed with patients nurse and requested that case management be notified if discharge needs are identified. Home with spouse. *Case management will continue to follow progress and update discharge plan as needed.

## 2022-07-12 NOTE — CONSULTS
Gastroenterology Consult Note        Patient: Radha Ogden  : 1960  Acct#:      Date:  2022    Subjective:       History of Present Illness  Patient is a 59 y.o.  female admitted with Chest pain [R07.9]  Accelerated hypertension [I10]  Acute chest pain [R07.9] who is seen in consult for melena and history of ulcers. History of hyperparathyroidism status post surgery, goiter. She came to the ER yesterday for pain in the mid to upper chest with radiation to the throat and neck and back. Pain started over the weekend and would wax and wane. Felt like pressure. Was worse with deep breath. May be had a sore throat. No fevers or chills. Underwent echo and stress test here which were unrevealing. Still with pain. Did have some pain in the right abdomen last week and some today. Some mild nausea no vomiting. No melena. Saw some red with a bowel movement and was unsure if it was blood or food she ate. Reports remote history of an ulcer diagnosed on barium upper GI. No NSAIDs due to kidney disease. Past Medical History:   Diagnosis Date    Hypertension       History reviewed. No pertinent surgical history. Past Endoscopic History: No prior EGD or colonoscopy. Admission Meds  No current facility-administered medications on file prior to encounter.      Current Outpatient Medications on File Prior to Encounter   Medication Sig Dispense Refill    calcitRIOL (ROCALTROL) 0.25 MCG capsule TAKE ONE CAPSULE BY MOUTH DAILY 30 capsule 3    vitamin D3 (CHOLECALCIFEROL) 25 MCG (1000 UT) TABS tablet Take 2 tablets by mouth daily 100 tablet 0    Calcium Carbonate Antacid 1000 MG CHEW Take 2 tablets by mouth in the morning and at bedtime       lisinopril (PRINIVIL;ZESTRIL) 10 MG tablet Take 1 tablet by mouth daily 90 tablet 1    amLODIPine (NORVASC) 5 MG tablet Take 1 tablet by mouth daily 30 tablet 5    atorvastatin (LIPITOR) 40 MG tablet TAKE ONE TABLET BY MOUTH DAILY  loratadine (CLARITIN) 10 MG tablet Take 10 mg by mouth daily         Patient denies ASA, NSAID use. Allergies  Allergies   Allergen Reactions    Methylene Blue Other (See Comments)     Pain in extremity of infusion \"throbbing, and it feels like a tourniquet is on my arm\"    Pcn [Penicillins] Hives      Social   Social History     Tobacco Use    Smoking status: Current Every Day Smoker     Packs/day: 1.00     Years: 15.00     Pack years: 15.00     Types: Cigarettes    Smokeless tobacco: Never Used    Tobacco comment: some days smoking up to 2 packs    Substance Use Topics    Alcohol use: Not Currently     Comment: socially        Family History   Problem Relation Age of Onset    Cancer Mother            Review of Systems  Constitutional: negative for fevers, chills, sweats    Ears, nose, mouth, throat, and face: negative for nasal congestion and sore throat   Respiratory: negative for cough and shortness of breath   Cardiovascular: negative for palpitations and dyspnea   Gastrointestinal: see hpi   Genitourinary:negative for dysuria and frequency   Integument/breast: negative for pruritus and rash   Hematologic/lymphatic: negative for lymphadenopathy and easy bruising   Musculoskeletal:negative for arthralgias and myalgias   Neurological: negative for dizziness and weakness         Physical Exam  Blood pressure 134/87, pulse 73, temperature 97.6 °F (36.4 °C), temperature source Oral, resp. rate 16, height 5' 7\" (1.702 m), weight 173 lb 1 oz (78.5 kg), last menstrual period 07/07/2012, SpO2 97 %. General appearance: alert, cooperative, no distress, appears stated age  Eyes: Anicteric  Head: Normocephalic, without obvious abnormality  Lungs: clear to auscultation bilaterally, Normal Effort  Heart: regular rate and rhythm, normal S1 and S2, no murmurs or rubs  Abdomen: soft, mild epigastric and right upper quadrant tenderness. Bowel sounds normal. No masses,  no organomegaly.    Extremities: atraumatic, no cyanosis or edema  Skin: warm and dry, no jaundice  Neuro: Grossly intact, A&OX3  Musculoskeletal: 5/5  strength BUE      Data Review:    Recent Labs     07/11/22  1103 07/12/22  0632   WBC 7.7 7.1   HGB 13.7 13.5   HCT 40.7 40.9   MCV 92.2 94.3    184     Recent Labs     07/11/22  1103 07/12/22  0632    141   K 3.9 4.7    107   CO2 24 24   BUN 24* 16   CREATININE 0.9 0.7     Recent Labs     07/11/22  1103   AST 25   ALT 20   BILITOT 0.8   ALKPHOS 75     Recent Labs     07/11/22  1103   LIPASE 48.0     Recent Labs     07/11/22  1103   PROTIME 12.9   INR 0.98     No results for input(s): PTT in the last 72 hours. No results for input(s): OCCULTBLD in the last 72 hours. Imaging Studies:                 CTA-scan of abdomen and pelvis w and wo contrast 7/11/22:  Impression   No CT evidence of aortic dissection. Assessment:     Principal Problem:    Chest pain  Active Problems:    Tobacco abuse    GERD (gastroesophageal reflux disease)    HTN (hypertension)    Primary hyperparathyroidism (HCC)    Multinodular goiter    Overweight (BMI 25.0-29. 9)  Resolved Problems:    * No resolved hospital problems. *    Noncardiac chest pain, right upper quadrant pain -began over the weekend. Cardiac eval here negative. A CTA of the chest negative. Normal liver enzymes and lipase. We will check gallbladder ultrasound. Also consider EGD.   This a very pleasant 68-year-old female comes in because she has been experiencing some severe chest pain over the last 3 days  This had kind of started on and off about 2 to 3 weeks ago but it got worse Saturday Sunday and Monday Monday she went to work and she was having severe chest pain  She was brought in to the emergency room  She underwent a cardiology work-up which was unrevealing  For asked to see and evaluate her because of an issue with possible noncardiac chest pain  To help us from a GI standpoint evaluate her for noncardiac chest pain would like to do an upper endoscopy and a right upper quadrant ultrasound    This patient was seen at the bedside today with our certified physicians assistant    Recommendations:   -Right upper quadrant ultrasound  -Trial of PPI  -N.p.o. midnight  -EGD tomorrow  -Recommended outpatient screening colonoscopy    Discussed with Dr. Lanie Zazueta, [address] [place] [address]

## 2022-07-12 NOTE — PROGRESS NOTES
Hospitalist Progress Note      PCP: Ming WILLS    Date of Admission: 7/11/2022    Chief Complaint: Chest pain    Hospital Course:   59 y.o. female with history of tobacco abuse, primary hyperparathyroidism, h/o kidney stones on Tums, GERD, HTN, multinodular goiter came to ER with complaints of chest pain. States she has substernal chest pressure that began on Saturday. Persistent. Associated with SOB. States she has worsening GAINES, cannot walk up a flight of stairs now. No BL LE edema or orthopnea. Smokes 1 1/2 ppd. Occasional EtOH. Placed in observation. Echo:  Normal left ventricle size, wall thickness, and systolic function with an   estimated ejection fraction of 65-70%. No regional wall motion abnormalities are seen. Normal diastolic function. Avg E/e'= 7.5. The right ventricle is mildly enlarged. Normal function with TAPSE estimated   to be 2.39 cm. The left atrium is moderately dilated. The right atrium is mildly dilated. Mild tricuspid regurgitation. SPECT 7/12/22:  Normal myocardial perfusion study.    Normal LV size and systolic function. CTA Chest:  No CT evidence of aortic dissection. Started on Protonix and Carafate. Seen by GI. For EGD and RUQ on 7/13/22. Subjective:  Patient with some reflux symptoms. Has history of ulcer in past.  No CP, SOB, HA or abdominal pain.        Medications:  Reviewed    Infusion Medications    sodium chloride      sodium chloride 75 mL/hr at 07/11/22 3241     Scheduled Medications    calcium carbonate  1,000 mg Oral BID    calcitRIOL  0.25 mcg Oral Daily    lisinopril  10 mg Oral Daily    cetirizine  10 mg Oral Daily    Vitamin D  2,000 Units Oral Daily    sodium chloride flush  5-40 mL IntraVENous 2 times per day    enoxaparin  40 mg SubCUTAneous Nightly    aspirin  81 mg Oral Daily    atorvastatin  40 mg Oral Nightly    pantoprazole  40 mg IntraVENous BID    sucralfate  1 g Oral 4 times per day    amLODIPine  5 mg Oral Nightly     PRN Meds: sodium chloride flush, sodium chloride, ondansetron **OR** ondansetron, polyethylene glycol, acetaminophen **OR** acetaminophen, nitroGLYCERIN, perflutren lipid microspheres, aluminum & magnesium hydroxide-simethicone, hydrALAZINE, morphine    No intake or output data in the 24 hours ending 07/12/22 1297    Physical Exam Performed:    /71   Pulse 67   Temp 97.9 °F (36.6 °C) (Oral)   Resp 16   Ht 5' 7\" (1.702 m)   Wt 173 lb 1 oz (78.5 kg)   LMP 07/07/2012   SpO2 98%   BMI 27.11 kg/m²     General appearance:  Appears comfortable. Well nourished  Eyes: Sclera clear, pupils equal  ENT: Moist mucus membranes, no thrush. Trachea midline. Cardiovascular: Regular rhythm, normal S1, S2. No murmur, gallop, rub. No edema in lower extremities  Respiratory: Clear to auscultation bilaterally, no wheeze, good inspiratory effort  Gastrointestinal: Abdomen soft, non-tender, not distended, normal bowel sounds  Musculoskeletal: No cyanosis in digits, neck supple  Neurology: Cranial nerves grossly intact. Alert and oriented in time, place and person. No speech or motor deficits  Psychiatry: Anxious affect.  Not agitated  Skin: Warm, dry, normal turgor, no rash  Brisk capillary refill, peripheral pulses palpable       Labs:   Recent Labs     07/11/22  1103 07/12/22  0632   WBC 7.7 7.1   HGB 13.7 13.5   HCT 40.7 40.9    184     Recent Labs     07/11/22  1103 07/12/22  0632    141   K 3.9 4.7    107   CO2 24 24   BUN 24* 16   CREATININE 0.9 0.7   CALCIUM 9.3 8.2*     Recent Labs     07/11/22  1103   AST 25   ALT 20   BILITOT 0.8   ALKPHOS 75     Recent Labs     07/11/22  1103   INR 0.98     Recent Labs     07/12/22  0039 07/12/22  0632 07/12/22  1150   TROPONINI <0.01 <0.01 <0.01       Urinalysis:      Lab Results   Component Value Date/Time    NITRU Negative 04/25/2022 02:09 PM    WBCUA 183 10/15/2021 10:43 AM    BACTERIA 1+ 10/15/2021 10:43 AM    RBCUA 3 10/15/2021 10:43 AM    BLOODU Negative 04/25/2022 02:09 PM    SPECGRAV 1.015 04/25/2022 02:09 PM    GLUCOSEU Negative 04/25/2022 02:09 PM       Radiology:  NM MYOCARDIAL SPECT REST EXERCISE OR RX   Final Result      CTA CHEST W WO CONTRAST   Final Result   No CT evidence of aortic dissection. XR CHEST PORTABLE   Final Result   No acute cardiopulmonary disease. US GALLBLADDER RUQ    (Results Pending)           Assessment/Plan:    Active Hospital Problems    Diagnosis     Chest pain [R07.9]      Priority: Medium    Tobacco abuse [Z72.0]      Priority: Medium    GERD (gastroesophageal reflux disease) [K21.9]      Priority: Medium    HTN (hypertension) [I10]      Priority: Medium    Overweight (BMI 25.0-29. 9) [E66.3]     Multinodular goiter [E04.2]     Primary hyperparathyroidism ([address] [E21.0]      1. EGD tomorrow  2. GI consult for reflux   3. Continue Protonix IV bid  4. Continue Carafate  5. Continue ASA, Lisinopril, Norvasc  and Lipitor  6. Resume Tums  7. Continue Rolcatrol    DVT Prophylaxis: Lovenox  Diet: Diet NPO  Diet NPO  Code Status: Full Code    PT/OT Eval Status: Not needed    Dispo - Home    Discussed with patient, nursing and CM. Will benefit from EGD.     Carlitos Isaacs MD

## 2022-07-13 VITALS
OXYGEN SATURATION: 97 % | RESPIRATION RATE: 18 BRPM | TEMPERATURE: 97.6 F | DIASTOLIC BLOOD PRESSURE: 89 MMHG | HEIGHT: 67 IN | WEIGHT: 175 LBS | SYSTOLIC BLOOD PRESSURE: 137 MMHG | HEART RATE: 63 BPM | BODY MASS INDEX: 27.47 KG/M2

## 2022-07-13 PROCEDURE — C9113 INJ PANTOPRAZOLE SODIUM, VIA: HCPCS | Performed by: INTERNAL MEDICINE

## 2022-07-13 PROCEDURE — 6360000002 HC RX W HCPCS: Performed by: INTERNAL MEDICINE

## 2022-07-13 PROCEDURE — 6370000000 HC RX 637 (ALT 250 FOR IP): Performed by: INTERNAL MEDICINE

## 2022-07-13 PROCEDURE — 96376 TX/PRO/DX INJ SAME DRUG ADON: CPT

## 2022-07-13 PROCEDURE — 2580000003 HC RX 258: Performed by: INTERNAL MEDICINE

## 2022-07-13 PROCEDURE — G0378 HOSPITAL OBSERVATION PER HR: HCPCS

## 2022-07-13 RX ORDER — PANTOPRAZOLE SODIUM 40 MG/1
40 TABLET, DELAYED RELEASE ORAL 2 TIMES DAILY
Qty: 60 TABLET | Refills: 0 | Status: SHIPPED | OUTPATIENT
Start: 2022-07-13 | End: 2022-10-07 | Stop reason: ALTCHOICE

## 2022-07-13 RX ORDER — SUCRALFATE 1 G/1
1 TABLET ORAL 4 TIMES DAILY
Qty: 28 TABLET | Refills: 0 | Status: SHIPPED | OUTPATIENT
Start: 2022-07-13 | End: 2022-10-07 | Stop reason: ALTCHOICE

## 2022-07-13 RX ADMIN — LISINOPRIL 10 MG: 10 TABLET ORAL at 08:50

## 2022-07-13 RX ADMIN — ASPIRIN 81 MG: 81 TABLET, CHEWABLE ORAL at 08:50

## 2022-07-13 RX ADMIN — ANTACID TABLETS 1000 MG: 500 TABLET, CHEWABLE ORAL at 08:50

## 2022-07-13 RX ADMIN — CALCITRIOL 0.25 MCG: 0.25 CAPSULE ORAL at 08:50

## 2022-07-13 RX ADMIN — PANTOPRAZOLE SODIUM 40 MG: 40 INJECTION, POWDER, FOR SOLUTION INTRAVENOUS at 08:49

## 2022-07-13 RX ADMIN — CETIRIZINE HYDROCHLORIDE 10 MG: 10 TABLET, FILM COATED ORAL at 08:50

## 2022-07-13 RX ADMIN — SODIUM CHLORIDE, PRESERVATIVE FREE 10 ML: 5 INJECTION INTRAVENOUS at 08:50

## 2022-07-13 RX ADMIN — Medication 2000 UNITS: at 08:50

## 2022-07-13 ASSESSMENT — PAIN SCALES - GENERAL
PAINLEVEL_OUTOF10: 0
PAINLEVEL_OUTOF10: 0

## 2022-07-13 NOTE — PROGRESS NOTES
Patient received her discharge instructions she verbalized understanding. We went over her new medications, focusing on the reason she is taking them and any possible side effects. Patient is going to make an appointment with Dr. Kim Crum for an outpatient EGD. Patient wanted to walk out, her boyfriend was picking her up out front. We walked to the elevator.

## 2022-07-13 NOTE — PROGRESS NOTES
Patient chose to discharge and follow up with GI later for her EGD. Her IV were removed without complication and she is getting dressed, her boy friend is picking her up.

## 2022-07-13 NOTE — DISCHARGE SUMMARY
Hospital Medicine Discharge Summary    Patient: Lisa Ramirez     Gender: female  : 1960   Age: 59 y.o. MRN: [id number]    Admitting Physician: Tung Hoover MD  Discharge Physician: Tung Hoover MD     Code Status: Full Code     Admit Date: 2022   Discharge Date:   22    Disposition:  Home    Discharge Diagnoses: Active Hospital Problems    Diagnosis Date Noted    Chest pain [R07.9] 2022     Priority: Medium    Tobacco abuse [Z72.0] 2022     Priority: Medium    GERD (gastroesophageal reflux disease) [K21.9] 2022     Priority: Medium    HTN (hypertension) [I10] 2022     Priority: Medium    Overweight (BMI 25.0-29. 9) [E66.3] 2021    Multinodular goiter [E04.2] 2021    Primary hyperparathyroidism ([address] [E21.0] 2021       Follow-up appointments:  one week    Outpatient to do list: F/U with PCP and GI.  EGD as outpatient    Condition at Discharge:  [address] Course:   59 y. o. female with history of tobacco abuse, primary hyperparathyroidism, h/o kidney stones on Tums, GERD, HTN, multinodular goiter came to ER with complaints of chest pain.  States she has substernal chest pressure that began on Saturday.  Persistent.  Associated with SOB.  States she has worsening GAINES, cannot walk up a flight of stairs now.  No BL LE edema or orthopnea.  Smokes 1 1/2 ppd.  Occasional EtOH. Placed in observation. Echo:  Normal left ventricle size, wall thickness, and systolic function with an   estimated ejection fraction of 65-70%.   No regional wall motion abnormalities are seen.   Normal diastolic function. Avg E/e'= 7.5.   The right ventricle is mildly enlarged. Normal function with TAPSE estimated   to be 2.39 cm.   The left atrium is moderately dilated.   The right atrium is mildly dilated.   Mild tricuspid regurgitation.     SPECT 22:  Normal myocardial perfusion study.    Normal LV size and systolic function.      CTA Chest:  No CT evidence of aortic dissection.     Started on Protonix and Carafate. Seen by GI.     For EGD as outpatient. RUQ US:  Impression   1. No acute abnormality. Will be on PO Protonix and Carafate. F/U with PCP and GI. Outpatient EGD. Discharge Medications:   Current Discharge Medication List      START taking these medications    Details   sucralfate (CARAFATE) 1 GM tablet Take 1 tablet by mouth 4 times daily for 7 days  Qty: 28 tablet, Refills: 0      pantoprazole (PROTONIX) 40 MG tablet Take 1 tablet by mouth in the morning and at bedtime  Qty: 60 tablet, Refills: 0           Current Discharge Medication List        Current Discharge Medication List      CONTINUE these medications which have NOT CHANGED    Details   calcitRIOL (ROCALTROL) 0.25 MCG capsule TAKE ONE CAPSULE BY MOUTH DAILY  Qty: 30 capsule, Refills: 3      vitamin D3 (CHOLECALCIFEROL) 25 MCG (1000 UT) TABS tablet Take 2 tablets by mouth daily  Qty: 100 tablet, Refills: 0    Associated Diagnoses: Hypocalcemia      Calcium Carbonate Antacid 1000 MG CHEW Take 2 tablets by mouth in the morning and at bedtime       lisinopril (PRINIVIL;ZESTRIL) 10 MG tablet Take 1 tablet by mouth daily  Qty: 90 tablet, Refills: 1      amLODIPine (NORVASC) 5 MG tablet Take 1 tablet by mouth daily  Qty: 30 tablet, Refills: 5      atorvastatin (LIPITOR) 40 MG tablet TAKE ONE TABLET BY MOUTH DAILY      loratadine (CLARITIN) 10 MG tablet Take 10 mg by mouth daily           Current Discharge Medication List            Discharge Exam:    /89   Pulse 63   Temp 97.6 °F (36.4 °C) (Oral)   Resp 18   Ht 5' 7\" (1.702 m)   Wt 175 lb (79.4 kg)   LMP 07/07/2012   SpO2 97%   BMI 27.41 kg/m²     General appearance:  Appears comfortable. Well nourished  Eyes: Sclera clear, pupils equal  ENT: Moist mucus membranes, no thrush. Trachea midline. Cardiovascular: Regular rhythm, normal S1, S2. No murmur, gallop, rub.  No edema in lower extremities  Respiratory: Clear to auscultation bilaterally, no wheeze, good inspiratory effort  Gastrointestinal: Abdomen soft, non-tender, not distended, normal bowel sounds  Musculoskeletal: No cyanosis in digits, neck supple  Neurology: Cranial nerves grossly intact. Alert and oriented in time, place and person. No speech or motor deficits  Psychiatry: Anxious affect. Not agitated  Skin: Warm, dry, normal turgor, no rash  Brisk capillary refill, peripheral pulses palpable     Labs:  For convenience and continuity at follow-up the following most recent labs are provided:    Lab Results   Component Value Date/Time    WBC 7.1 07/12/2022 06:32 AM    HGB 13.5 07/12/2022 06:32 AM    HCT 40.9 07/12/2022 06:32 AM    MCV 94.3 07/12/2022 06:32 AM     07/12/2022 06:32 AM     07/12/2022 06:32 AM    K 4.7 07/12/2022 06:32 AM     07/12/2022 06:32 AM    CO2 24 07/12/2022 06:32 AM    BUN 16 07/12/2022 06:32 AM    CREATININE 0.7 07/12/2022 06:32 AM    CALCIUM 8.2 07/12/2022 06:32 AM    PHOS 4.5 04/25/2022 01:54 PM    ALKPHOS 75 07/11/2022 11:03 AM    ALT 20 07/11/2022 11:03 AM    AST 25 07/11/2022 11:03 AM    BILITOT 0.8 07/11/2022 11:03 AM    LABALBU 4.8 07/11/2022 11:03 AM     Lab Results   Component Value Date    INR 0.98 07/11/2022       Radiology:  Echo Complete    Result Date: 7/12/2022  Transthoracic Echocardiography Report (TTE)  Demographics   Patient Name       Yadira Romeo   Date of Study      07/12/2022        Gender              Female   Patient Number     [id number]        Date of Birth       1960   Visit Number       [id number]         Age                 59 year(s)   Accession Number   [id number]        Room Number         5906   Corporate ID       [id number]           Sonographer         Siri Rubio                                                           RDCS, RVT, BS                                                           [facility], [address] [place] [address] Ordering Physician Sahnon Salvador MD  Interpreting        Miracle Osorio MD,                                       Physician           [facility]  Procedure Type of Study   TTE procedure:ECHOCARDIOGRAM COMPLETE 2D W DOPPLER W COLOR. Procedure Date Date: 07/12/2022 Start: 09:38 AM Study Location: [facility] - Echo Lab Technical Quality: Adequate visualization Indications:Chest pain. Patient Status: Routine Height: 67 inches Weight: 173 pounds BSA: 1.9 m2 BMI: 27.1 kg/m2  Conclusions   Summary  Normal left ventricle size, wall thickness, and systolic function with an  estimated ejection fraction of 65-70%. No regional wall motion abnormalities are seen. Normal diastolic function. Avg E/e'= 7.5. The right ventricle is mildly enlarged. Normal function with TAPSE estimated  to be 2.39 cm. The left atrium is moderately dilated. The right atrium is mildly dilated. Mild tricuspid regurgitation. Signature   ------------------------------------------------------------------  Electronically signed by Miracle Osorio MD, [facility] (Interpreting  physician) on 07/12/2022 at 11:52 AM  ------------------------------------------------------------------   Findings   Left Ventricle  Normal left ventricle size, wall thickness, and systolic function with an  estimated ejection fraction of 65-70%. No regional wall motion abnormalities  are seen. Normal diastolic function. Avg E/e'= 7.5. Mitral Valve  The mitral valve normal in structure. No evidence of mitral stenosis. Trivial mitral regurgitation is present. Left Atrium  The left atrium is moderately dilated. Aortic Valve  The aortic valve is structurally normal. There is no significant aortic  valve regurgitation or stenosis. Aorta  The aortic root is normal in size. The ascending aorta is normal in size. Right Ventricle  The right ventricle is mildly enlarged. Normal function with TAPSE estimated  to be 2.39 cm.    Tricuspid Valve  The tricuspid valve is normal in structure. There is no significant  tricuspid valve stenosis. Mild tricuspid regurgitation. RVSP estimated to be 25 mmHg. Right Atrium  The right atrium is mildly dilated. Pulmonic Valve  The pulmonic valve is structurally normal.  No evidence of pulmonic valve regurgitation. No evidence of pulmonic valve stenosis. Pericardial Effusion  No pericardial effusion noted. Pleural Effusion  No pleural effusion. Miscellaneous  The inferior vena cava appears normal in size with normal respiratory  variation. M-Mode/2D Measurements (cm)   LV Diastolic Dimension: 8.31 cm LV Systolic Dimension: 2.89 cm  LV Septum Diastolic: 0.45 cm  LV PW Diastolic: 3.33 cm        AO Root Dimension: 3 cm                                  LA Dimension: 3.7 cm                                  LA Area: 25.6 cm2  LVOT: 2 cm                      LA volume/Index: 81.1 ml /43 ml/m2  Doppler Measurements   AV Peak Velocity: 121 cm/s     MV Peak E-Wave: 67.2 cm/s  AV Peak Gradient: 5.86 mmHg    MV Peak A-Wave: 71.6 cm/s  AV Mean Gradient: 3 mmHg       MV E/A Ratio: 0.94  LVOT Peak Velocity: 117 cm/s  AV Area (Continuity):2.94 cm2   TR Velocity:236 cm/s  TR Gradient:22.28 mmHg  Estimated RAP:3 mmHg  Estimated RVSP: 25 mmHg  E' Septal Velocity: 7.62 cm/s  E' Lateral Velocity: 10.8 cm/s  PV Peak Velocity: 93.5 cm/s  PV Peak Gradient: 3.5 mmHg   Aortic Valve   Peak Velocity: 121 cm/s     Mean Velocity: 79.4 cm/s  Peak Gradient: 5.86 mmHg    Mean Gradient: 3 mmHg  Area (continuity): 2.94 cm2  AV VTI: 26.6 cm  Aorta   Aortic Root: 3 cm  Ascending Aorta: 3.4 cm  LVOT Diameter: 2 cm      CTA CHEST W WO CONTRAST    Result Date: 7/11/2022  EXAMINATION: CTA OF THE CHEST WITH AND WITHOUT CONTRAST 7/11/2022 11:58 am TECHNIQUE: CTA of the chest was performed before and after the administration of intravenous contrast.  Multiplanar reformatted images are provided for review. MIP images are provided for review.  Automated exposure control, iterative reconstruction, and/or weight based adjustment of the mA/kV was utilized to reduce the radiation dose to as low as reasonably achievable. COMPARISON: None. HISTORY: ORDERING SYSTEM PROVIDED HISTORY: rule out dissection TECHNOLOGIST PROVIDED HISTORY: Reason for exam:->rule out dissection Decision Support Exception - unselect if not a suspected or confirmed emergency medical condition->Emergency Medical Condition (MA) Reason for Exam: rule out dissection FINDINGS: Aorta: No evidence of thoracic aortic aneurysm or dissection. No acute abnormality of the aorta. Mediastinum: No evidence of mediastinal lymphadenopathy. The heart and pericardium demonstrate no acute abnormality. Lungs/Pleura: The lungs are without acute process. No focal consolidation or pulmonary edema. No evidence of pleural effusion or pneumothorax. There is posterior bibasilar subsegmental atelectasis. Upper Abdomen: Limited images of the upper abdomen are unremarkable. Soft Tissues/Bones: No acute bone or soft tissue abnormality. No CT evidence of aortic dissection. US THYROID    Result Date: 7/5/2022  Site: [facility] #: [id number] #: [id number]: BTLRUSAccount #: [de-identified] #: [id number] #: [id number]: US THYROIDExam Date/Time: 07/05/2022 12:00 PMAdmitting Diagnosis: Reason for Exam: Dictated by: Annabella Russell BIRD: 07/05/2022 04:59 PMT: This document is confidential medical information. Unauthorized disclosure or use of this information is prohibited by law. If you are not the intended recipient of this document, please advise  us by calling immediately [phone]. Impression/Conclusion below HISTORY: Nontoxic multinodular goiter COMPARISON: Thyroid ultrasound June 30, 2020.  TECHNIQUE:  Ultrasound images of the thyroid gland NOTE:  If there are questions about the content of this report, please contact [address] [place] [address] radiology by calling [phone] FINDINGS:  RIGHT LOBE MEASUREMENTS:  4.6 x 2.2 exam:->cp Reason for Exam: chest pain FINDINGS: No acute airspace infiltrate. No pneumothorax or pleural effusion. Normal cardiomediastinal silhouette     No acute cardiopulmonary disease. NM MYOCARDIAL SPECT REST EXERCISE OR RX    Result Date: 7/12/2022  Cardiac Perfusion Imaging  Demographics   Patient Name       Jason Funk   Date of Study      07/12/2022         Gender              Female   Patient Number     [id number]         Date of Birth       1960   Visit Number       [id number]          Age                 59 year(s)   Accession Number   [id number]         Room Number         2364   Corporate ID       [id number]            NM Technician       Ramses Hodge, RT   Nurse              Cal Solis,  Interpreting        Garcia Osman MD,                     RN                 Physician           [facility]   Ordering Physician Nain Krueger MD   The procedure was explained in detail to the patient. Risks,  complications and alternative treatments were reviewed. Written consent  was obtained. Procedure Procedure Type:   Nuclear Stress Test:Exercise, NM MYOCARDIAL SPECT REST EXERCISE OR RX   Study location: [facility] - Nuclear Medicine   Indications: Chest pain. Hospital Status: Outpatient. Height: 67 inches Weight: 173 pounds  Risk Factors   The patient risk factors include:Current/Recent(w/in 1 year) tobacco use,  treated and controlled hypercholesterolemia, treated and controlled  hypertension, family history of premature CAD and dyslipidemia. Conclusions   Summary  Normal myocardial perfusion study. Normal LV size and systolic function. Stress Protocols   Resting ECG  Normal sinus rhythm. Normal ECG. Resting HR:72 bpm       Resting BP:139/89 mmHg   Pre-stress physical exam: Resting pulse ox 98% RA.   gxt myoview  Stress Protocol:Exercise - Wilmer  Peak HR:125 bpm                                  HR/BP product:93710  Peak BP:184/94 mmHg                              Max exercise: 10 METS  Predicted HR: 157 bpm  % of predicted HR: 80  Test duration:9 min and 21 sec  Reason for termination:Physiologic Maximum   ECG Findings  Normal response to exercise stress . Symptoms  There was stress induced shortness of breath and throat/upper chest neck  pain 4/10. Symptoms resolved with rest.  Pulse ox 98% RA. Complications  Procedure complication was none. Stress Interpretation  ECG portion of stress test is normal.   Imaging Protocols   - One Day   Rest                          Stress   Isotope:Myoview/Tetrofosmin   Isotope: Myoview/Tetrofosmin  Isotope dose:10.6 mCi         Isotope dose:31.7 mCi  Administration Route:I.V. Administration Route:I.V.  Date:07/12/2022 08:14         Date:07/12/2022 09:29                                 Technique:      Gated  Imaging Results    Stress ejection    Ejection fraction:73 %    EDV :90 ml    ESV :24 ml    Stroke volume :66 ml    LV mass :143 gr  Medical History   Additional Medical History   calcitRIOL (ROCALTROL) 0.25 MCG capsule TAKE ONE CAPSULE BY  MOUTH DAILY  vitamin D3 (CHOLECALCIFEROL) 25 MCG (1000 UT) TABS tablet Take 2  tablets by mouth daily  Calcium Carbonate Antacid 1000 MG CHEW Take 2 tablets by mouth  in the morning and at bedtime  lisinopril (PRINIVIL;ZESTRIL) 10 MG tablet Take 1 tablet by  mouth daily  amLODIPine (NORVASC) 5 MG tablet Take 1 tablet by mouth daily  atorvastatin (LIPITOR) 40 MG tablet TAKE ONE TABLET BY MOUTH  DAILY  loratadine (CLARITIN) 10 MG tablet   Signatures   ------------------------------------------------------------------  Electronically signed by Heaven Duff MD, [facility] (Interpreting  physician) on 07/12/2022 at 11:50  ------------------------------------------------------------------        The patient was seen and examined on day of discharge and this discharge summary is in conjunction with any daily progress note from day of discharge. Time Spent on discharge is 45 minutes  in the examination, evaluation, counseling and review of medications and discharge plan. Note that more than 30 minutes was spent in preparing discharge papers, discussing discharge with patient, medication review, etc.       Signed:    Cristiana Snyder MD   7/13/2022      Thank you CAROL CANSECO for the opportunity to be involved in this patient's care.  If you have any questions or concerns please feel free to contact me at [address] [place] [address]

## 2022-07-13 NOTE — PROGRESS NOTES
Spoke with Danette Way in Endo and they are putting this patient on hold until tomorrow because they are completely booked. Patient might be able to go home and follow-up as an outpatient. I am waiting for Jazzmine from GI to get in touch with me.

## 2022-07-13 NOTE — PROGRESS NOTES
Gastroenterology Progress Note      Jonathan Kennedy is a 59 y.o. female patient. 1. Acute chest pain    2. Accelerated hypertension        SUBJECTIVE:  Less pressure pain in upper chest/neck. No abdominal pain. Tolerated solid food yesterday. ROS:  Cardiovascular ROS: no chest pain or dyspnea on exertion  Gastrointestinal ROS: see hpi  Respiratory ROS: no cough, shortness of breath, or wheezing    Physical    VITALS:  /74   Pulse 62   Temp 98 °F (36.7 °C) (Oral)   Resp 18   Ht 5' 7\" (1.702 m)   Wt 175 lb (79.4 kg)   LMP 2012   SpO2 97%   BMI 27.41 kg/m²   TEMPERATURE:  Current - Temp: 98 °F (36.7 °C); Max - Temp  Av.9 °F (36.6 °C)  Min: 97.6 °F (36.4 °C)  Max: 98.3 °F (36.8 °C)    NAD  RRR, Nl s1s2  Lungs CTA Bilaterally, normal effort  Abdomen soft, ND, NT, no HSM, Bowel sounds normal  AAOx3     Data    Data Review:    Recent Labs     22  1103 22  0632   WBC 7.7 7.1   HGB 13.7 13.5   HCT 40.7 40.9   MCV 92.2 94.3    184     Recent Labs     22  1103 22  0632    141   K 3.9 4.7    107   CO2 24 24   BUN 24* 16   CREATININE 0.9 0.7     Recent Labs     22  1103   AST 25   ALT 20   BILITOT 0.8   ALKPHOS 75     Recent Labs     22  1103   LIPASE 48.0     Recent Labs     22  1103   PROTIME 12.9   INR 0.98     No results for input(s): PTT in the last 72 hours. RUQ US 22  Impression:     1. No acute abnormality. ASSESSMENT       Noncardiac chest pain, right upper quadrant pain -began over the weekend. Cardiac eval here negative. A CTA of the chest negative. Normal liver enzymes and lipase. normal gallbladder ultrasound. Some upper chest/neck pressure today still. No abdominal pain. Tolerated diet yesterday. PLAN    -Trial of PPI  -Unfortunately not time in endo schedule for inpatient EGD today. Will plan outpatient EGD for noncardiac chest pain.  Pt agreeable  -Recommended outpatient screening colonoscopy. Pt will think about this.   - ok for d/c home from GI standpoint with outpatient follow up.      Discussed with Dr. Ekta Rainey, [address] [place] [address]

## 2022-07-18 ENCOUNTER — TELEPHONE (OUTPATIENT)
Dept: ENDOCRINOLOGY | Age: 62
End: 2022-07-18

## 2022-07-18 NOTE — TELEPHONE ENCOUNTER
Pt called and states she was seen in the hospital for chest pain and they did a bunch of test & labs and referred her to see a Gastroenterologist specialist    Pt is calling because she has tingling all over her body and was told to call our office if she started experiencing this because it could be related to her parathyroid / calcium    # [phone]

## 2022-07-20 NOTE — TELEPHONE ENCOUNTER
Called and informed pt, pt expressed understanding. Pt has started on pantoprazole, has d/c since reading it can affect the calcium. Tingling has decreased. All medications you listed she is taking the same. Also was prescribed sucralfate, has d/c'd as well.

## 2022-07-20 NOTE — TELEPHONE ENCOUNTER
Patient returning call, you can reach her at [phone], anytime is ok, she is at work but will turn phone on.

## 2022-07-20 NOTE — TELEPHONE ENCOUNTER
Patient called again, I advised the nurse will call her back when she is available to discuss results.

## 2022-07-29 ENCOUNTER — HOSPITAL ENCOUNTER (OUTPATIENT)
Age: 62
Discharge: HOME | End: 2022-07-29
Payer: COMMERCIAL

## 2022-07-29 ENCOUNTER — TELEPHONE (OUTPATIENT)
Dept: ENDOCRINOLOGY | Age: 62
End: 2022-07-29

## 2022-07-29 DIAGNOSIS — M85.80 OSTEOPENIA, UNSPECIFIED LOCATION: ICD-10-CM

## 2022-07-29 DIAGNOSIS — E89.2 POSTSURGICAL HYPOPARATHYROIDISM (HCC): ICD-10-CM

## 2022-07-29 DIAGNOSIS — E21.0 PRIMARY HYPERPARATHYROIDISM (HCC): ICD-10-CM

## 2022-07-29 DIAGNOSIS — E04.2 MULTINODULAR GOITER: ICD-10-CM

## 2022-07-29 DIAGNOSIS — E83.51 HYPOCALCEMIA: ICD-10-CM

## 2022-07-29 LAB
A/G RATIO: 1.6 (ref 1.1–2.2)
ALBUMIN SERPL-MCNC: 4.3 G/DL (ref 3.4–5)
ALP BLD-CCNC: 67 U/L (ref 40–129)
ALT SERPL-CCNC: 16 U/L (ref 10–40)
ANION GAP SERPL CALCULATED.3IONS-SCNC: 13 MMOL/L (ref 3–16)
AST SERPL-CCNC: 21 U/L (ref 15–37)
BILIRUB SERPL-MCNC: 0.6 MG/DL (ref 0–1)
BUN BLDV-MCNC: 27 MG/DL (ref 7–20)
CALCIUM SERPL-MCNC: 8.6 MG/DL (ref 8.3–10.6)
CHLORIDE BLD-SCNC: 103 MMOL/L (ref 99–110)
CO2: 24 MMOL/L (ref 21–32)
CREAT SERPL-MCNC: 0.9 MG/DL (ref 0.6–1.2)
GFR AFRICAN AMERICAN: >60
GFR NON-AFRICAN AMERICAN: >60
GLUCOSE BLD-MCNC: 91 MG/DL (ref 70–99)
MAGNESIUM: 2 MG/DL (ref 1.8–2.4)
PARATHYROID HORMONE INTACT: 7.7 PG/ML (ref 14–72)
PHOSPHORUS: 3.8 MG/DL (ref 2.5–4.9)
POTASSIUM SERPL-SCNC: 4.1 MMOL/L (ref 3.5–5.1)
SODIUM BLD-SCNC: 140 MMOL/L (ref 136–145)
T3 FREE: 2.7 PG/ML (ref 2.3–4.2)
T4 FREE: 1.1 NG/DL (ref 0.9–1.8)
TOTAL PROTEIN: 7 G/DL (ref 6.4–8.2)
TSH SERPL DL<=0.05 MIU/L-ACNC: 2.29 UIU/ML (ref 0.27–4.2)

## 2022-07-29 PROCEDURE — 84100 ASSAY OF PHOSPHORUS: CPT

## 2022-07-29 PROCEDURE — 80053 COMPREHEN METABOLIC PANEL: CPT

## 2022-07-29 PROCEDURE — 36415 COLL VENOUS BLD VENIPUNCTURE: CPT

## 2022-07-29 PROCEDURE — 82330 ASSAY OF CALCIUM: CPT

## 2022-07-29 PROCEDURE — 83970 ASSAY OF PARATHORMONE: CPT

## 2022-07-29 PROCEDURE — 84443 ASSAY THYROID STIM HORMONE: CPT

## 2022-07-29 PROCEDURE — 82306 VITAMIN D 25 HYDROXY: CPT

## 2022-07-29 PROCEDURE — 84481 FREE ASSAY (FT-3): CPT

## 2022-07-29 PROCEDURE — 84439 ASSAY OF FREE THYROXINE: CPT

## 2022-07-29 PROCEDURE — 83735 ASSAY OF MAGNESIUM: CPT

## 2022-07-30 LAB
CALCIUM IONIZED, CALC AT PH 7.4: 1.17 MMOL/L (ref 1.11–1.3)
CALCIUM IONIZED: 1.13 MMOL/L (ref 1.11–1.3)
VITAMIN D 25-HYDROXY: 54.1 NG/ML

## 2022-07-30 NOTE — TELEPHONE ENCOUNTER
Please inform patient:  Calcium is normal.  Thyroid test is normal.  Electrolytes, liver and kidney function, all normal.  Vitamin D still pending. Please ask patient if she still experiencing symptoms of tingling, or any other symptoms.

## 2022-08-02 NOTE — TELEPHONE ENCOUNTER
Pt called back. Gave her the message regarding lab results. She states no symptoms,no tingling.  No further questions from pt

## 2022-10-07 ENCOUNTER — OFFICE VISIT (OUTPATIENT)
Dept: ENDOCRINOLOGY | Age: 62
End: 2022-10-07
Payer: COMMERCIAL

## 2022-10-07 VITALS
WEIGHT: 182 LBS | HEART RATE: 75 BPM | HEIGHT: 67 IN | SYSTOLIC BLOOD PRESSURE: 122 MMHG | TEMPERATURE: 98 F | BODY MASS INDEX: 28.56 KG/M2 | OXYGEN SATURATION: 98 % | DIASTOLIC BLOOD PRESSURE: 76 MMHG | RESPIRATION RATE: 14 BRPM

## 2022-10-07 DIAGNOSIS — N18.31 STAGE 3A CHRONIC KIDNEY DISEASE (HCC): ICD-10-CM

## 2022-10-07 DIAGNOSIS — E89.2 STATUS POST PARATHYROIDECTOMY (HCC): ICD-10-CM

## 2022-10-07 DIAGNOSIS — M85.80 OSTEOPENIA, UNSPECIFIED LOCATION: ICD-10-CM

## 2022-10-07 DIAGNOSIS — E04.2 MULTINODULAR GOITER: ICD-10-CM

## 2022-10-07 DIAGNOSIS — E21.0 PRIMARY HYPERPARATHYROIDISM (HCC): Primary | ICD-10-CM

## 2022-10-07 DIAGNOSIS — E83.51 HYPOCALCEMIA: ICD-10-CM

## 2022-10-07 DIAGNOSIS — E66.3 OVERWEIGHT (BMI 25.0-29.9): ICD-10-CM

## 2022-10-07 DIAGNOSIS — E89.2 POSTSURGICAL HYPOPARATHYROIDISM (HCC): ICD-10-CM

## 2022-10-07 PROCEDURE — G8484 FLU IMMUNIZE NO ADMIN: HCPCS | Performed by: INTERNAL MEDICINE

## 2022-10-07 PROCEDURE — 3017F COLORECTAL CA SCREEN DOC REV: CPT | Performed by: INTERNAL MEDICINE

## 2022-10-07 PROCEDURE — 4004F PT TOBACCO SCREEN RCVD TLK: CPT | Performed by: INTERNAL MEDICINE

## 2022-10-07 PROCEDURE — 99215 OFFICE O/P EST HI 40 MIN: CPT | Performed by: INTERNAL MEDICINE

## 2022-10-07 PROCEDURE — G8419 CALC BMI OUT NRM PARAM NOF/U: HCPCS | Performed by: INTERNAL MEDICINE

## 2022-10-07 PROCEDURE — G8427 DOCREV CUR MEDS BY ELIG CLIN: HCPCS | Performed by: INTERNAL MEDICINE

## 2022-10-07 RX ORDER — CALCITRIOL 0.25 UG/1
CAPSULE, LIQUID FILLED ORAL
Qty: 30 CAPSULE | Refills: 5 | Status: SHIPPED | OUTPATIENT
Start: 2022-10-07

## 2022-10-07 NOTE — PROGRESS NOTES
SUBJECTIVE:  Marion Carroll is a 57 y.o. female who is being evaluated for hyperparathyroidism. 1. Primary hyperparathyroidism ([address]    This started in 2016. Patient was diagnosed with hyperparathyroidism. The problem has been gradually worsening. Patient started medication in 2020. Currently patient is on: Calcitriol, vitamin D, magnesium, Tums. Misses  0 doses a month. Current complaints: No tingling, but has foot cramps, fatigue  Hot flashes 1-2 times daily    2. Status post parathyroidectomy    Parathyroidectomy 9/15/2020  4 gland hyperplasia, gland reimplantation into left sternocleidomastoid muscle on 9/15/2020    3. Hypocalcemia/postsurgical hypoparathyroidism  Patient has foot cramps  Patient developed hypocalcemia after parathyroid surgery. Tums 1000 mg 2 tablets 3 times a day, calcitriol 0.5 mcg daily, magnesium 250 mg daily, vitamin D 1000 international units daily    4. Osteopenia, unspecified location  Had rib fractures  Mother had hip fracture  No steroids long term. No RA  Smoker    5. Multinodular goiter  History of obstructive symptoms: difficulty swallowing No, changes in voice/hoarseness Yes. History of radiation to patient's neck: No  Resent iodine exposure: No  Family history includes mother had neck surgery  Family history of thyroid cancer: No    6. Overweight (BMI 25.0-29. 9)  Eats healthy, active    7. Chronic kidney disease stage 3  No urination problems    Site: Duane Grimm #: [id number] #: [id number]: BTLRBCAccount #: [de-identified] #: [id number] #: [id number]: BONE DENSITY - DEXAExam Date/Time: 05/24/2022 01:30 PMAdmitting Diagnosis: Reason for Exam:        Dictated by: Huong Figueredo BIRD: 06/03/2022 09:47 AMT: This document is confidential medical information. Unauthorized disclosure or use of this information is prohibited by law. If you are not the intended recipient of this document, please advise us by    calling immediately [phone]. Impression/Conclusion below       HISTORY: Primary hyperparathyroidism; Other specified disorders of bone density and structure,    unspecified site  Screening for osteoporosis   COMPARISON:  2020   NOTE:  If there are questions about the content of this report, please contact [facility]    radiology by calling [phone]       DXA machine: [employer] Veneg[employer] (S/N [id number]). [facility] in g/cm2 at 95% confidence: Lumbar spine    0.059, Total hip 0.037. FRAX software version: 3.08.   COMPARISON machine:  Same       TECHNICAL LIMITATIONS/EXCLUSIONS: None        FINDINGS:       LUMBAR SPINE L1-L4:   T-score: -1.0   BMD in g/cm2: 0.939        LEFT HIP:   Neck T-score: -1.7   Neck BMD in g/cm2: 0.656    Total T-score: -1.1   Total BMD in g/cm2: 0.807        LEFT 33% RADIUS:   T-score: -1.5   BMD in g/cm2: 0.603       FRAX:   FRAX risk factor(s): None    Major osteoporotic fracture risk: 27%   Hip fracture risk:  3.4%   The FRAX calculation is based on patient answers to a questionnaire and can be associated with    overestimation or underestimation of fracture risk. Therefore FRAX should be validated by the    ordering physician. NOTE REGARDING TREATMENT:   Approaches to reduce osteoporosis-related fracture risk include optimizing calcium and vitamin    D status and fall-prevention measures. The National Osteoporosis Foundation treatment    guidelines recommend treatment for:   -  Those with hip fracture or vertebral fracture (clinical or asymptomatic)   -  Those with T-score -2.5 or lower at total hip, femoral neck or spine by DXA, after    appropriate evaluation   -  Low bone mass and 10-year probability of a hip fracture equal to or greater than 3% or a    10-year probability of a major osteoporotic fracture equal to or greater than 20% (FRAX). All treatment decisions require clinical management and consideration of individual factors. IMPRESSION:           OSTEOPENIA.  T-scores are in the low bone density range, meeting WHO criteria for osteopenia. Potential treatment should be based on individual risk factors. It is important to exclude    secondary causes of low bone density. Patients with low bone density    should have regular DXA scans. The scanning interval should be determined according to the    clinical status of the patient and/or the treatment regimen. INTERVAL CHANGE: There is a statistically significant change as follows: There has been a change of 0.078 g/cm2 or a 9% increase at the lumbar spine       Comparison is made using BMD, not T-scores. The spine followed by the total hip are the most    reliable anatomic sites for comparison. While the femoral neck and radius are validated for    diagnosis, these sites typically have lower precision and are    therefore less reliable for evaluating change in BMD and/or decision making regarding    pharmacologic therapy. SIGNED BY: Karie Gunn. El Schulz MD on 6/3/2022  9:44 AM         [address] [place] [address] [phone] -  [facility]: [phone]               OSTEOPENIA. T-scores are in the low bone density range, meeting WHO criteria for osteopenia. Potential treatment should be based on individual risk factors. It is important to exclude secondary causes of low bone density. Patients with low bone density   should have regular DXA scans. The scanning interval should be determined according to the clinical status of the patient and/or the treatment regimen. INTERVAL CHANGE: Not applicable    Result Narrative   HISTORY: Encounter for screening for osteoporosis  Screening for osteoporosis  COMPARISON:  None  NOTE:  If there are questions about the content of this report, please contact [facility] radiology by calling [phone]    DXA machine: [employer] (S/N [id number]). 100 [facility] in g/cm2 at 95% confidence: Lumbar spine NA, Total hip NA.  FRAX software version: 3.08  COMPARISON machine:  None    TECHNICAL LIMITATIONS/EXCLUSIONS: None   SKELETAL SITES (REGIONS OF INTEREST): Lumbar spine (L1-4) and left hip    FINDINGS:    LUMBAR SPINE:  T-score for the lumbar spine: -1.7  BMD for the lumbar spine in g/cm2: 0.861     HIP:  T-score for the femoral neck: -1.9  BMD for the femoral neck in g/cm2: 0.634   T-score for the total hip: -1.6  BMD for the total hip in g/cm2: 0.742     FRAX:  FRAX risk factor(s): Tobacco use   Major osteoporotic fracture risk (%): 9.7%  Hip fracture risk(%):  2.0%      NOTES:  National Osteoporosis Foundation criteria for using FRAX: untreated postmenopausal women or men 48 or older with T-score between -1.0 and -2.5, no prior hip or vertebral fracture and a DXA evaluable hip. FRAX can be calculated outside of these parameters if desired. The FRAX online calculator is available at  [email or web address]. ac.uk/FRAX/    World Health Organization reference values:  (based on lowest T-score of the lumbar spine, femoral neck, total hip, or 33% radius)  Normal:  T score at or above -1.0   Osteopenia:  T score between -1.0 and -2.5  Osteoporosis:  T score at or below -2.5  Severe osteoporosis: T-score at or below -2.5 and a fragility fracture (prior or new)    Approaches to reduce osteoporosis-related fracture risk include optimizing calcium and vitamin D status and fall-prevention measures. The National Osteoporosis Foundation treatment guidelines recommend treatment for :  -  Those with hip fracture or vertebral fracture (clinical or asymptomatic)  -  Those with T-score -2.5 or lower at total hip, femoral neck or spine by DXA, after appropriate evaluation  -  Low bone mass and 10-year probability of a hip fracture equal to or greater than 3% or a 10-year probability of a major osteoporotic fracture equal to or greater than 20% (FRAX).   All treatment decisions require clinical management and consideration of individual factors including patient preferences, comorbidities, prior drug use, risk factors not captured in FRAX model (e.g.-sarcopenia, vitamin D deficiency, increased bone   turnover, interval significant decline in bone density) and possible under or over estimation of fracture risk by FRAX   Status Results Details              Result Impression     Diffuse small right thyroid nodules. No evidence of enlarged parathyroid nodule. RECOMMENDATION:   No FNA biopsy or imaging followup recommended   Result Narrative   HISTORY: Primary hyperparathyroidism  COMPARISON: None  TECHNIQUE:  Ultrasound images of the thyroid gland  NOTE:  If there are questions about the content of this report, please contact [address] [place] [address] by calling [phone]    FINDINGS:    RIGHT LOBE MEASUREMENTS:  5.0 x 1.6 x 1.4 cm (normal <  5 x 1.5 x 1.5 cm)  LEFT LOBE MEASUREMENTS:  4.8 x 1.3 x 1.1 cm (normal <  5 x 1.5 x 1.5 cm)  ADJACENT MASSES OR ENLARGED LYMPH NODES:  None    THYROID GLAND:    One or more subcentimeter thyroid nodule(s) which are smoothly marginated, not taller-than-wide, and with no echogenic foci, deemed to be clinically insignificant. There is a small round solid nodule in right lobe measuring 8 mm x 5 mm x 8 mm. Lesion is   slightly hyperechoic. In the posterior aspect of the lower pole right lobe there is heterogenous nodule measuring approximately 6 cm x 5 mm x 5 mm.     NOTE: Recommendations are based on the TI-RADS 2017 [employer] of Radiology ultrasound-based risk stratification system to classify thyroid nodules that may warrant biopsy or sonographic follow-up   Other Result Information   Interface, Rad Results In - 06/30/2020  5:02 PM EDT  HISTORY: Primary hyperparathyroidism  COMPARISON: None  TECHNIQUE:  Ultrasound images of the thyroid gland  NOTE:  If there are questions about the content of this report, please contact [address] [place] [address] by calling [phone]    FINDINGS:    RIGHT LOBE MEASUREMENTS:  5.0 x 1.6 x 1.4 cm (normal <  5 x 1.5 x 1.5 cm)  LEFT LOBE MEASUREMENTS:  4.8 x 1.3 x 1.1 cm (normal <  5 x 1.5 x 1.5 cm)  ADJACENT MASSES OR ENLARGED LYMPH NODES:  None    THYROID GLAND:    One or more subcentimeter thyroid nodule(s) which are smoothly marginated, not taller-than-wide, and with no echogenic foci, deemed to be clinically insignificant. There is a small round solid nodule in right lobe measuring 8 mm x 5 mm x 8 mm. Lesion is   slightly hyperechoic. In the posterior aspect of the lower pole right lobe there is heterogenous nodule measuring approximately 6 cm x 5 mm x 5 mm. NOTE: Recommendations are based on the TI-RADS 2017 [address] [place] [address] Radiology ultrasound-based risk stratification system to classify thyroid nodules that may warrant biopsy or sonographic follow-up    IMPRESSION    Diffuse small right thyroid nodules. No evidence of enlarged parathyroid nodule. RECOMMENDATION:   No FNA biopsy or imaging followup recommended   Status Results Details          Site: Roger Wolfe #: [id number]: [id number]: BTLRUSAccount #: [de-identified] #: [id number]: [id number]: US THYROIDExam Date/Time: 07/05/2022 12:00 PMAdmitting Diagnosis: Reason for Exam:        Dictated by: Evie Camacho BIRD: 07/05/2022 04:59 PMT: This document is confidential medical information. Unauthorized disclosure or use of this information is prohibited by law. If you are not the intended recipient of this document, please advise    us by calling immediately [phone]. Impression/Conclusion below       HISTORY: Nontoxic multinodular goiter   COMPARISON: Thyroid ultrasound June 30, 2020.    TECHNIQUE:  Ultrasound images of the thyroid gland   NOTE:  If there are questions about the content of this report, please contact [address] [place] [address]    radiology by calling [phone]       FINDINGS:     RIGHT LOBE MEASUREMENTS:  4.6 x 2.2 x 1.4 cm (normal <  5 x 1.5 x 1.5 cm)   LEFT LOBE MEASUREMENTS:  3.1 x 1.2 x 1.3 cm (normal <  5 x 1.5 x 1.5 cm)   ADJACENT MASSES OR ENLARGED LYMPH NODES: None       THYROID GLAND:     2 small subcentimeter nodules are again seen within the right thyroid which are unchanged    compared to prior. These do not require further evaluation or follow-up based on current    guidelines. No new or enlarging nodules identified. IMPRESSION:        Stable appearance of 2 small nodules in the right thyroid which do not require further    evaluation or follow-up. No new or enlarging nodules. RECOMMENDATION:    No FNA biopsy or imaging followup recommended   SIGNED BY: Scotty Carey MD on 7/5/2022  4:56 PM         [facility] [phone] -  [facility]: [phone]               Past Medical History:   Diagnosis Date    Hypertension      Patient Active Problem List    Diagnosis Date Noted    Stage 3a chronic kidney disease ([facility] [address] 10/07/2022    Chest pain 07/11/2022    Tobacco abuse 07/11/2022    GERD (gastroesophageal reflux disease) 07/11/2022    HTN (hypertension) 07/11/2022    Postsurgical hypoparathyroidism ([address] 02/09/2021    Multinodular goiter 02/08/2021    Overweight (BMI 25.0-29.9) 02/08/2021    Primary hyperparathyroidism ([address] 02/07/2021    Status post parathyroidectomy ([facility] [address] 02/07/2021    Hypocalcemia 02/07/2021    Osteopenia 02/07/2021     History reviewed. No pertinent surgical history.   Family History   Problem Relation Age of Onset    Cancer Mother      Social History     Socioeconomic History    Marital status: Single     Spouse name: None    Number of children: None    Years of education: None    Highest education level: None   Tobacco Use    Smoking status: Every Day     Packs/day: 1.00     Years: 15.00     Pack years: 15.00     Types: Cigarettes    Smokeless tobacco: Never    Tobacco comments:     some days smoking up to 2 packs    Vaping Use    Vaping Use: Never used   Substance and Sexual Activity    Alcohol use: Not Currently     Comment: socially    Drug use: Not Currently     Types: Marijuana Farrukh Osei)    Sexual activity: Yes     Current Outpatient Medications   Medication Sig Dispense Refill    calcitRIOL (ROCALTROL) 0.25 MCG capsule TAKE ONE CAPSULE BY MOUTH DAILY 30 capsule 5    vitamin D3 (CHOLECALCIFEROL) 25 MCG (1000 UT) TABS tablet Take 2 tablets by mouth daily 100 tablet 0    Calcium Carbonate Antacid 1000 MG CHEW Take 2 tablets by mouth in the morning and at bedtime       lisinopril (PRINIVIL;ZESTRIL) 10 MG tablet Take 1 tablet by mouth daily 90 tablet 1    amLODIPine (NORVASC) 5 MG tablet Take 1 tablet by mouth daily 30 tablet 5    atorvastatin (LIPITOR) 40 MG tablet TAKE ONE TABLET BY MOUTH DAILY      loratadine (CLARITIN) 10 MG tablet Take 10 mg by mouth daily       No current facility-administered medications for this visit.      Allergies   Allergen Reactions    Methylene Blue Other (See Comments)     Pain in extremity of infusion \"throbbing, and it feels like a tourniquet is on my arm\"    Pcn [Penicillins] Hives     Family Status   Relation Name Status    Mother  (Not Specified)       Review of Systems:  Constitutional: has fatigue, no fever, no recent weight gain, no recent weight loss, no changes in appetite  Eyes: no eye pain, has change in vision, no eye redness, no eye irritation, no double vision  Ears, nose, throat: has nasal congestion, no sore throat, no earache, no decrease in hearing, no hoarseness, no dry mouth, has sinus problems, no difficulty swallowing, no neck lumps, no dental problems, no mouth sores, no ringing in ears  Pulmonary: no shortness of breath, no wheezing, no dyspnea on exertion, no cough  Cardiovascular: no chest pain, no lower extremity edema, no orthopnea, no intermittent leg claudication, has palpitations  Gastrointestinal: no abdominal pain, no nausea, no vomiting, no diarrhea, has constipation, no dysphagia, has heartburn, no bloating  Genitourinary: no dysuria, no urinary incontinence, no urinary hesitancy, no urinary frequency, no feelings of urinary urgency, no nocturia  Musculoskeletal: no joint swelling, no joint stiffness, has joint pain, has muscle cramps, has muscle pain, no bone pain  Integument/Breast: no hair loss, no skin rashes, no skin lesions, no itching, no dry skin  Neurological: has numbness, has tingling, no weakness, no confusion, has headaches, has dizziness, no fainting, no tremors, no decrease in memory, no balance problems  Psychiatric: no anxiety, no depression, no insomnia  Hematologic/Lymphatic: no tendency for easy bleeding, no swollen lymph nodes, no tendency for easy bruising  Immunology: has seasonal allergies, no frequent infections, no frequent illnesses  Endocrine: no temperature intolerance    /76   Pulse 75   Temp 98 °F (36.7 °C)   Resp 14   Ht 5' 7\" (1.702 m)   Wt 182 lb (82.6 kg)   LMP 07/07/2012   SpO2 98%   BMI 28.51 kg/m²    Wt Readings from Last 3 Encounters:   10/07/22 182 lb (82.6 kg)   07/13/22 175 lb (79.4 kg)   06/03/22 180 lb (81.6 kg)     Body mass index is 28.51 kg/m².     OBJECTIVE:  Constitutional: no acute distress, well appearing and well nourished  Psychiatric: oriented to person, place and time, judgement and insight and normal, recent and remote memory and intact and mood and affect are normal  Skin: skin and subcutaneous tissue is normal without mass, normal turgor  Head and Face: examination of head and face revealed no abnormalities  Eyes: no lid or conjunctival swelling, erythema or discharge, pupils are normal, equal, round, reactive to light  Ears/Nose: external inspection of ears and nose revealed no abnormalities, hearing is grossly normal  Oropharynx/Mouth/Face: lips, tongue and gums are normal with no lesions, the voice quality was normal  Neck: neck is supple and symmetric, with midline trachea and no masses, thyroid is enlarged  Lymphatics: normal cervical lymph nodes, normal supraclavicular nodes  Pulmonary: no increased work of breathing or signs of respiratory distress, lungs are clear to auscultation  Cardiovascular: normal heart rate and rhythm, normal S1 and S2, no murmurs and pedal pulses and 2+ bilaterally, No edema  Abdomen: abdomen is soft, non-tender with no masses  Musculoskeletal: normal gait and station and exam of the digits and nails are normal  Neurological: normal coordination and normal general cortical function      Lab Review:    Lab Results   Component Value Date/Time    WBC 7.1 07/12/2022 06:32 AM    HGB 13.5 07/12/2022 06:32 AM    HCT 40.9 07/12/2022 06:32 AM    MCV 94.3 07/12/2022 06:32 AM     07/12/2022 06:32 AM     Lab Results   Component Value Date/Time     07/29/2022 10:04 AM    K 4.1 07/29/2022 10:04 AM    K 4.7 07/12/2022 06:32 AM     07/29/2022 10:04 AM    CO2 24 07/29/2022 10:04 AM    BUN 27 07/29/2022 10:04 AM    CREATININE 0.9 07/29/2022 10:04 AM    GLUCOSE 91 07/29/2022 10:04 AM    CALCIUM 8.6 07/29/2022 10:04 AM    PROT 7.0 07/29/2022 10:04 AM    LABALBU 4.3 07/29/2022 10:04 AM    BILITOT 0.6 07/29/2022 10:04 AM    ALKPHOS 67 07/29/2022 10:04 AM    AST 21 07/29/2022 10:04 AM    ALT 16 07/29/2022 10:04 AM    LABGLOM >60 07/29/2022 10:04 AM    GFRAA >60 07/29/2022 10:04 AM    AGRATIO 1.6 07/29/2022 10:04 AM    GLOB 3.0 10/15/2021 10:32 AM     Lab Results   Component Value Date/Time    TSH 2.29 07/29/2022 10:04 AM    FT3 2.7 07/29/2022 10:04 AM     No results found for: LABA1C  No results found for: EAG  No results found for: CHOL  No results found for: TRIG  No results found for: HDL  No results found for: LDLCHOLESTEROL, LDLCALC  No results found for: LABVLDL, VLDL  No results found for: [facility]  Lab Results   Component Value Date/Time    LABMICR Not Indicated 04/25/2022 02:09 PM     Lab Results   Component Value Date/Time    VITD25 54.1 07/29/2022 10:04 AM        ASSESSMENT/PLAN:  1. Primary hyperparathyroidism ([facility] [address]  Calcium 10.2-9.5-11.0-9.7-8.2-8.6  Parathyroidectomy 9/15/2020  - Comprehensive Metabolic Panel;  Future  - Calcium Ionized Serum; Future  - PTH, Intact; Future  - Phosphorus; Future  - Magnesium; Future  - Vitamin D 25 Hydroxy; Future    2. Status post parathyroidectomy  Parathyroidectomy 9/15/2020  4 gland hyperplasia, gland reimplantation into left sternocleidomastoid muscle on 9/15/2020  - Comprehensive Metabolic Panel; Future  - Calcium Ionized Serum; Future  - PTH, Intact; Future  - Phosphorus; Future  - Magnesium; Future  - Vitamin D 25 Hydroxy; Future    3. Hypocalcemia/postsurgical hypoparathyroidism  PTH <1.2-7-10-6.5-7.7-7.7  Calcium 9.5-11.0-9.7-9.5-8.6  25OHvitamin D 38.8-37.7-42.3-50.2-40.9  Uncontrolled postsurgical hypoparathyroidism  Calcitriol 0.25 mcg daily   Continue close monitoring  - calcitRIOL (ROCALTROL) 0.25 MCG capsule; Take 0.25 mcg by mouth daily  - magnesium 400 mg daily was disconnected because of diarrhea  - vitamin D3 (CHOLECALCIFEROL) 25 MCG (1000 UT) TABS tablet; Take 1 tablet by mouth daily   - Tums 2 tablets twice daily  - Vitamin D 25 Hydroxy; Future  - Calcium Ionized Serum; Future  - PTH, Intact; Future  - Phosphorus; Future  - Magnesium; Future  - Comprehensive Metabolic Panel; Future    4. Osteopenia, unspecified location  Has upset stomach, has ulcer history  Recommended Reclast  High fracture risk  6/3/2022  LUMBAR SPINE L1-L4:   T-score: -1.0   BMD in g/cm2: 0.939     LEFT HIP:   Neck T-score: -1.7   Neck BMD in g/cm2: 0.656   Total T-score: -1.1   Total BMD in g/cm2: 0.807     LEFT 33% RADIUS:   T-score: -1.5   BMD in g/cm2: 0.603     FRAX:   FRAX risk factor(s): None   Major osteoporotic fracture risk: 27%   Hip fracture risk:  3.4%   The FRAX calculation is based on patient answers to a questionnaire and can be associated with overestimation or underestimation of fracture risk. Therefore FRAX should be validated by the ordering physician. DEXA January 2020  Monitor bone density  OSTEOPENIA. T-scores are in the low bone density range, meeting WHO criteria for osteopenia. INTERVAL CHANGE: Not applicable   COMPARISON:  None   DXA machine: [employer] (S/N [id number]). 100 [facility] in g/cm2 at 95% confidence: Lumbar spine NA, Total hip NA. FRAX software version: 3.08   LUMBAR SPINE:   T-score for the lumbar spine: -1.7   BMD for the lumbar spine in g/cm2: 0.861   HIP:   T-score for the femoral neck: -1.9   BMD for the femoral neck in g/cm2: 0.634   T-score for the total hip: -1.6   BMD for the total hip in g/cm2: 0.742     FRAX:   FRAX risk factor(s): Tobacco use   Major osteoporotic fracture risk (%): 9.7%   Hip fracture risk(%):  2.0%        - Comprehensive Metabolic Panel; Future  - Vitamin D 25 Hydroxy; Future    5. Multinodular goiter  7/5/2022  2 subcm right thyroid nodules  Continue monitoring  TSH 2.27-1.9-3.19-2.33-2.15  -Thyroid sonogram  - T3, Free; Future  - T4, Free; Future  - TSH without Reflex; Future    6. Overweight (BMI 25.0-29. 9)  Diet, exercise    7. Chronic kidney disease stage 3  Creatinine 1.35-0.9  Decreased GFR 42->60  Feet cramps  Refer to Nephrologist    Reviewed and/or ordered clinical lab results Yes  Reviewed and/or ordered radiology tests Yes   Reviewed and/or ordered other diagnostic tests No  Discussed test results with performing physician No  Independently reviewed image, tracing, or specimen No  Made a decision to obtain old records No  Reviewed and summarized old records Yes   Calcium 11-10.8-11.1-10.7-10.8-10.7-7.9, upper limit normal 10.4  Elevated calcium since 2016  25 hydroxy vitamin D 23.7-37  PTH 90.7-81.80-6.08-6.73  TSH 2.15  SURGICAL PATHOLOGY REPORT    Name: Kavon Diaz  EPI#: [id number]  Acct#: [de-identified]    Case #: [id number]    Final Pathologic Diagnosis  A. Possible left possible superior parathyroid, biopsy:  -     Parathyroid tissue, 0.003 g.    B.  Possible left inferior parathyroid, excision:  -     Hypercellular parathyroid tissue,0.093 g.  -     Thymic tissue.     C.  Possible right inferior parathyroid gland, excision:  - Adipose tissue. D.  Possible right superior parathyroid, excision:  -     Hypercellular parathyroid tissue, 0.164 g.    E.  Possible right inferior parathyroid, excision:  -          Fragments of thyroid and parathyroid tissue, 0.091 g. MCS2/MCS2/9/16/2020 11:54:37  Electronically Ansley Diaz MD  Specimen(s) Received  A. Possible left possible superior parathyroid  B. Possible left inferior parathyroid  C. Possible right inferior parathyroid gland  D. Possible right superior parathyroid  E.  Possible right inferior parathyroid    Obtained history from other than patient Kellen Bustamante was counseled regarding symptoms of primary hyperparathyroidism, postsurgical hypoparathyroidism, thyroid disease, multinodular goiter diagnosis, course and complications of disease if inadequately treated, side effects of medications, diagnosis, treatment options, and prognosis, risks, benefits, complications, and alternatives of treatment, labs, imaging and other studies and treatment targets and goals, signs and symptoms of hyperparathyroidism, treatment, side effects of medications, risk of cancer in thyroid nodules. She understands instructions and counseling. Total time I spent for this encounter 40 minutes    Return in about 3 months (around 1/7/2023) for thyroid problems, parathyroid.     Electronically signed by Brent Booth MD on 10/7/2022 at 9:54 AM

## 2023-02-03 ENCOUNTER — OFFICE VISIT (OUTPATIENT)
Dept: ENDOCRINOLOGY | Age: 63
End: 2023-02-03

## 2023-02-03 VITALS
TEMPERATURE: 98 F | SYSTOLIC BLOOD PRESSURE: 138 MMHG | HEIGHT: 67 IN | HEART RATE: 78 BPM | DIASTOLIC BLOOD PRESSURE: 82 MMHG | OXYGEN SATURATION: 98 % | RESPIRATION RATE: 14 BRPM | WEIGHT: 179 LBS | BODY MASS INDEX: 28.09 KG/M2

## 2023-02-03 DIAGNOSIS — E83.51 HYPOCALCEMIA: ICD-10-CM

## 2023-02-03 DIAGNOSIS — E89.2 STATUS POST PARATHYROIDECTOMY (HCC): ICD-10-CM

## 2023-02-03 DIAGNOSIS — N18.31 STAGE 3A CHRONIC KIDNEY DISEASE (HCC): ICD-10-CM

## 2023-02-03 DIAGNOSIS — E21.0 PRIMARY HYPERPARATHYROIDISM (HCC): ICD-10-CM

## 2023-02-03 DIAGNOSIS — M85.80 OSTEOPENIA, UNSPECIFIED LOCATION: ICD-10-CM

## 2023-02-03 DIAGNOSIS — E21.0 PRIMARY HYPERPARATHYROIDISM (HCC): Primary | ICD-10-CM

## 2023-02-03 DIAGNOSIS — E89.2 POSTSURGICAL HYPOPARATHYROIDISM (HCC): ICD-10-CM

## 2023-02-03 DIAGNOSIS — E04.2 MULTINODULAR GOITER: ICD-10-CM

## 2023-02-03 DIAGNOSIS — E66.3 OVERWEIGHT (BMI 25.0-29.9): ICD-10-CM

## 2023-02-03 LAB
ALBUMIN SERPL-MCNC: 4.8 G/DL (ref 3.4–5)
ANION GAP SERPL CALCULATED.3IONS-SCNC: 12 MMOL/L (ref 3–16)
BUN BLDV-MCNC: 30 MG/DL (ref 7–20)
CALCIUM SERPL-MCNC: 8.6 MG/DL (ref 8.3–10.6)
CHLORIDE BLD-SCNC: 106 MMOL/L (ref 99–110)
CO2: 26 MMOL/L (ref 21–32)
CREAT SERPL-MCNC: 0.9 MG/DL (ref 0.6–1.2)
GFR SERPL CREATININE-BSD FRML MDRD: >60 ML/MIN/{1.73_M2}
GLUCOSE BLD-MCNC: 93 MG/DL (ref 70–99)
PHOSPHORUS: 3.7 MG/DL (ref 2.5–4.9)
POTASSIUM SERPL-SCNC: 5 MMOL/L (ref 3.5–5.1)
SODIUM BLD-SCNC: 144 MMOL/L (ref 136–145)
VITAMIN D 25-HYDROXY: 46.6 NG/ML

## 2023-02-03 RX ORDER — CALCITRIOL 0.25 UG/1
CAPSULE, LIQUID FILLED ORAL
Qty: 30 CAPSULE | Refills: 5 | Status: SHIPPED | OUTPATIENT
Start: 2023-02-03

## 2023-02-03 NOTE — PROGRESS NOTES
SUBJECTIVE:  Sherine Blunt is a 57 y.o. female who is being evaluated for hyperparathyroidism. 1. Primary hyperparathyroidism ([address]    This started in 2016. Patient was diagnosed with hyperparathyroidism. The problem has been gradually worsening. Patient started medication in 2020. Currently patient is on: Calcitriol, vitamin D, magnesium, Tums. Misses  0 doses a month. Current complaints: No tingling, but has foot cramps, fatigue  Hot flashes 1-2 times daily    2. Status post parathyroidectomy    Parathyroidectomy 9/15/2020  4 gland hyperplasia, gland reimplantation into left sternocleidomastoid muscle on 9/15/2020    3. Hypocalcemia/postsurgical hypoparathyroidism  Patient has foot cramps  Patient developed hypocalcemia after parathyroid surgery. Tums 1000 mg 2 tablets 3 times a day, calcitriol 0.5 mcg daily, magnesium 250 mg daily, vitamin D 1000 international units daily    4. Osteopenia, unspecified location  Had rib fractures  Mother had hip fracture  No steroids long term. No RA  Smoker    5. Multinodular goiter  History of obstructive symptoms: difficulty swallowing No, changes in voice/hoarseness Yes. History of radiation to patient's neck: No  Resent iodine exposure: No  Family history includes mother had neck surgery  Family history of thyroid cancer: No    6. Overweight (BMI 25.0-29. 9)  Eats healthy, active    7. Chronic kidney disease stage 3  No urination problems    Site: [employer] [employer] #: [id number] #: [id number]: BTLRBCAccount #: [de-identified] #: [id number] #: [id number]: BONE DENSITY - DEXAExam Date/Time: 05/24/2022 01:30 PMAdmitting Diagnosis: Reason for Exam:        Dictated by: Denver Colas BIRD: 06/03/2022 09:47 AMT: This document is confidential medical information. Unauthorized disclosure or use of this information is prohibited by law. If you are not the intended recipient of this document, please advise us by    calling immediately [phone]. Impression/Conclusion below       HISTORY: Primary hyperparathyroidism; Other specified disorders of bone density and structure,    unspecified site  Screening for osteoporosis   COMPARISON:  2020   NOTE:  If there are questions about the content of this report, please contact [facility]    radiology by calling [phone]       DXA machine: Lulu Hamlin (S/N [id number]). 100 [facility] in g/cm2 at 95% confidence: Lumbar spine    0.059, Total hip 0.037. FRAX software version: 3.08.   COMPARISON machine:  Same       TECHNICAL LIMITATIONS/EXCLUSIONS: None        FINDINGS:       LUMBAR SPINE L1-L4:   T-score: -1.0   BMD in g/cm2: 0.939        LEFT HIP:   Neck T-score: -1.7   Neck BMD in g/cm2: 0.656    Total T-score: -1.1   Total BMD in g/cm2: 0.807        LEFT 33% RADIUS:   T-score: -1.5   BMD in g/cm2: 0.603       FRAX:   FRAX risk factor(s): None    Major osteoporotic fracture risk: 27%   Hip fracture risk:  3.4%   The FRAX calculation is based on patient answers to a questionnaire and can be associated with    overestimation or underestimation of fracture risk. Therefore FRAX should be validated by the    ordering physician. NOTE REGARDING TREATMENT:   Approaches to reduce osteoporosis-related fracture risk include optimizing calcium and vitamin    D status and fall-prevention measures. The National Osteoporosis Foundation treatment    guidelines recommend treatment for:   -  Those with hip fracture or vertebral fracture (clinical or asymptomatic)   -  Those with T-score -2.5 or lower at total hip, femoral neck or spine by DXA, after    appropriate evaluation   -  Low bone mass and 10-year probability of a hip fracture equal to or greater than 3% or a    10-year probability of a major osteoporotic fracture equal to or greater than 20% (FRAX). All treatment decisions require clinical management and consideration of individual factors. IMPRESSION:           OSTEOPENIA.  T-scores are in the low bone density range, meeting WHO criteria for osteopenia. Potential treatment should be based on individual risk factors. It is important to exclude    secondary causes of low bone density. Patients with low bone density    should have regular DXA scans. The scanning interval should be determined according to the    clinical status of the patient and/or the treatment regimen. INTERVAL CHANGE: There is a statistically significant change as follows: There has been a change of 0.078 g/cm2 or a 9% increase at the lumbar spine       Comparison is made using BMD, not T-scores. The spine followed by the total hip are the most    reliable anatomic sites for comparison. While the femoral neck and radius are validated for    diagnosis, these sites typically have lower precision and are    therefore less reliable for evaluating change in BMD and/or decision making regarding    pharmacologic therapy. SIGNED BY: Ariel Panda. Irma Hess MD on 6/3/2022  9:44 AM         [facility] [phone] -  [facility]: [phone]               OSTEOPENIA. T-scores are in the low bone density range, meeting WHO criteria for osteopenia. Potential treatment should be based on individual risk factors. It is important to exclude secondary causes of low bone density. Patients with low bone density   should have regular DXA scans. The scanning interval should be determined according to the clinical status of the patient and/or the treatment regimen. INTERVAL CHANGE: Not applicable    Result Narrative   HISTORY: Encounter for screening for osteoporosis  Screening for osteoporosis  COMPARISON:  None  NOTE:  If there are questions about the content of this report, please contact [address] [place] [address] radiology by calling [phone]    DXA machine: Dave Wetzel (S/N [id number]). [address] [place] [address] in g/cm2 at 95% confidence: Lumbar spine NA, Total hip NA.  FRAX software version: 3.08  COMPARISON machine:  None    TECHNICAL LIMITATIONS/EXCLUSIONS: None   SKELETAL SITES (REGIONS OF INTEREST): Lumbar spine (L1-4) and left hip    FINDINGS:    LUMBAR SPINE:  T-score for the lumbar spine: -1.7  BMD for the lumbar spine in g/cm2: 0.861     HIP:  T-score for the femoral neck: -1.9  BMD for the femoral neck in g/cm2: 0.634   T-score for the total hip: -1.6  BMD for the total hip in g/cm2: 0.742     FRAX:  FRAX risk factor(s): Tobacco use   Major osteoporotic fracture risk (%): 9.7%  Hip fracture risk(%):  2.0%      NOTES:  National Osteoporosis Foundation criteria for using FRAX: untreated postmenopausal women or men 48 or older with T-score between -1.0 and -2.5, no prior hip or vertebral fracture and a DXA evaluable hip. FRAX can be calculated outside of these parameters if desired. The FRAX online calculator is available at  [email or web address]. ac.uk/FRAX/    World Health Organization reference values:  (based on lowest T-score of the lumbar spine, femoral neck, total hip, or 33% radius)  Normal:  T score at or above -1.0   Osteopenia:  T score between -1.0 and -2.5  Osteoporosis:  T score at or below -2.5  Severe osteoporosis: T-score at or below -2.5 and a fragility fracture (prior or new)    Approaches to reduce osteoporosis-related fracture risk include optimizing calcium and vitamin D status and fall-prevention measures. The National Osteoporosis Foundation treatment guidelines recommend treatment for :  -  Those with hip fracture or vertebral fracture (clinical or asymptomatic)  -  Those with T-score -2.5 or lower at total hip, femoral neck or spine by DXA, after appropriate evaluation  -  Low bone mass and 10-year probability of a hip fracture equal to or greater than 3% or a 10-year probability of a major osteoporotic fracture equal to or greater than 20% (FRAX).   All treatment decisions require clinical management and consideration of individual factors including patient preferences, comorbidities, prior drug use, risk factors not captured in FRAX model (e.g.-sarcopenia, vitamin D deficiency, increased bone   turnover, interval significant decline in bone density) and possible under or over estimation of fracture risk by FRAX   Status Results Details              Result Impression     Diffuse small right thyroid nodules. No evidence of enlarged parathyroid nodule. RECOMMENDATION:   No FNA biopsy or imaging followup recommended   Result Narrative   HISTORY: Primary hyperparathyroidism  COMPARISON: None  TECHNIQUE:  Ultrasound images of the thyroid gland  NOTE:  If there are questions about the content of this report, please contact [address] [place] [address] by calling [phone]    FINDINGS:    RIGHT LOBE MEASUREMENTS:  5.0 x 1.6 x 1.4 cm (normal <  5 x 1.5 x 1.5 cm)  LEFT LOBE MEASUREMENTS:  4.8 x 1.3 x 1.1 cm (normal <  5 x 1.5 x 1.5 cm)  ADJACENT MASSES OR ENLARGED LYMPH NODES:  None    THYROID GLAND:    One or more subcentimeter thyroid nodule(s) which are smoothly marginated, not taller-than-wide, and with no echogenic foci, deemed to be clinically insignificant. There is a small round solid nodule in right lobe measuring 8 mm x 5 mm x 8 mm. Lesion is   slightly hyperechoic. In the posterior aspect of the lower pole right lobe there is heterogenous nodule measuring approximately 6 cm x 5 mm x 5 mm.     NOTE: Recommendations are based on the TI-RADS 2017 [address] [place] [address] Radiology ultrasound-based risk stratification system to classify thyroid nodules that may warrant biopsy or sonographic follow-up   Other Result Information   Interface, Rad Results In - 06/30/2020  5:02 PM EDT  HISTORY: Primary hyperparathyroidism  COMPARISON: None  TECHNIQUE:  Ultrasound images of the thyroid gland  NOTE:  If there are questions about the content of this report, please contact [address] [place] [address] by calling [phone]    FINDINGS:    RIGHT LOBE MEASUREMENTS:  5.0 x 1.6 x 1.4 cm (normal <  5 x 1.5 x 1.5 cm)  LEFT LOBE MEASUREMENTS:  4.8 x 1.3 x 1.1 cm (normal <  5 x 1.5 x 1.5 cm)  ADJACENT MASSES OR ENLARGED LYMPH NODES:  None    THYROID GLAND:    One or more subcentimeter thyroid nodule(s) which are smoothly marginated, not taller-than-wide, and with no echogenic foci, deemed to be clinically insignificant. There is a small round solid nodule in right lobe measuring 8 mm x 5 mm x 8 mm. Lesion is   slightly hyperechoic. In the posterior aspect of the lower pole right lobe there is heterogenous nodule measuring approximately 6 cm x 5 mm x 5 mm. NOTE: Recommendations are based on the TI-RADS 2017 [address] [place] [address] Radiology ultrasound-based risk stratification system to classify thyroid nodules that may warrant biopsy or sonographic follow-up    IMPRESSION    Diffuse small right thyroid nodules. No evidence of enlarged parathyroid nodule. RECOMMENDATION:   No FNA biopsy or imaging followup recommended   Status Results Details          Site: Warden Betancourt #: [id number] #: [id number]: BTLRUSAccount #: [de-identified] #: [id number] #: [id number]: US THYROIDExam Date/Time: 07/05/2022 12:00 PMAdmitting Diagnosis: Reason for Exam:        Dictated by: Priscilla Ruiz BIRD: 07/05/2022 04:59 PMT: This document is confidential medical information. Unauthorized disclosure or use of this information is prohibited by law. If you are not the intended recipient of this document, please advise    us by calling immediately [phone]. Impression/Conclusion below       HISTORY: Nontoxic multinodular goiter   COMPARISON: Thyroid ultrasound June 30, 2020.    TECHNIQUE:  Ultrasound images of the thyroid gland   NOTE:  If there are questions about the content of this report, please contact [address] [place] [address] by calling [phone]       FINDINGS:     RIGHT LOBE MEASUREMENTS:  4.6 x 2.2 x 1.4 cm (normal <  5 x 1.5 x 1.5 cm)   LEFT LOBE MEASUREMENTS:  3.1 x 1.2 x 1.3 cm (normal <  5 x 1.5 x 1.5 cm)   ADJACENT MASSES OR ENLARGED LYMPH NODES: None       THYROID GLAND:     2 small subcentimeter nodules are again seen within the right thyroid which are unchanged    compared to prior. These do not require further evaluation or follow-up based on current    guidelines. No new or enlarging nodules identified. IMPRESSION:        Stable appearance of 2 small nodules in the right thyroid which do not require further    evaluation or follow-up. No new or enlarging nodules. RECOMMENDATION:    No FNA biopsy or imaging followup recommended   SIGNED BY: Ariel Reid MD on 7/5/2022  4:56 PM         [facility] [phone] [facility]: [phone]               Past Medical History:   Diagnosis Date    Hypertension      Patient Active Problem List    Diagnosis Date Noted    Stage 3a chronic kidney disease ([facility] 10/07/2022    Chest pain 07/11/2022    Tobacco abuse 07/11/2022    GERD (gastroesophageal reflux disease) 07/11/2022    HTN (hypertension) 07/11/2022    Postsurgical hypoparathyroidism ([address] 02/09/2021    Multinodular goiter 02/08/2021    Overweight (BMI 25.0-29.9) 02/08/2021    Primary hyperparathyroidism ([address] 02/07/2021    Status post parathyroidectomy ([facility] 02/07/2021    Hypocalcemia 02/07/2021    Osteopenia 02/07/2021     History reviewed. No pertinent surgical history.   Family History   Problem Relation Age of Onset    Cancer Mother      Social History     Socioeconomic History    Marital status: Single     Spouse name: None    Number of children: None    Years of education: None    Highest education level: None   Tobacco Use    Smoking status: Every Day     Packs/day: 1.00     Years: 15.00     Pack years: 15.00     Types: Cigarettes    Smokeless tobacco: Never    Tobacco comments:     some days smoking up to 2 packs    Vaping Use    Vaping Use: Never used   Substance and Sexual Activity    Alcohol use: Not Currently     Comment: socially    Drug use: Not Currently     Types: Marijuana Francis Webster)    Sexual activity: Yes     Current Outpatient Medications   Medication Sig Dispense Refill    calcitRIOL (ROCALTROL) 0.25 MCG capsule TAKE ONE CAPSULE BY MOUTH DAILY 30 capsule 5    amLODIPine (NORVASC) 5 MG tablet TAKE ONE TABLET BY MOUTH DAILY 30 tablet 0    vitamin D3 (CHOLECALCIFEROL) 25 MCG (1000 UT) TABS tablet Take 2 tablets by mouth daily 100 tablet 0    Calcium Carbonate Antacid 1000 MG CHEW Take 2 tablets by mouth in the morning and at bedtime       lisinopril (PRINIVIL;ZESTRIL) 10 MG tablet Take 1 tablet by mouth daily 90 tablet 1    atorvastatin (LIPITOR) 40 MG tablet TAKE ONE TABLET BY MOUTH DAILY      loratadine (CLARITIN) 10 MG tablet Take 10 mg by mouth daily       No current facility-administered medications for this visit.      Allergies   Allergen Reactions    Methylene Blue Other (See Comments)     Pain in extremity of infusion \"throbbing, and it feels like a tourniquet is on my arm\"    Pcn [Penicillins] Hives     Family Status   Relation Name Status    Mother  (Not Specified)       Review of Systems:  Constitutional: has fatigue, no fever, no recent weight gain, no recent weight loss, no changes in appetite  Eyes: no eye pain, has change in vision, no eye redness, no eye irritation, no double vision  Ears, nose, throat: has nasal congestion, no sore throat, no earache, no decrease in hearing, no hoarseness, no dry mouth, has sinus problems, no difficulty swallowing, no neck lumps, no dental problems, no mouth sores, no ringing in ears  Pulmonary: no shortness of breath, no wheezing, no dyspnea on exertion, no cough  Cardiovascular: no chest pain, no lower extremity edema, no orthopnea, no intermittent leg claudication, has palpitations  Gastrointestinal: no abdominal pain, no nausea, no vomiting, no diarrhea, has constipation, no dysphagia, has heartburn, no bloating  Genitourinary: no dysuria, no urinary incontinence, no urinary hesitancy, no urinary frequency, no feelings of urinary urgency, no nocturia  Musculoskeletal: no joint swelling, no joint stiffness, has joint pain, has muscle cramps, has muscle pain, no bone pain  Integument/Breast: no hair loss, no skin rashes, no skin lesions, no itching, no dry skin  Neurological: has numbness, has tingling, no weakness, no confusion, has headaches, has dizziness, no fainting, no tremors, no decrease in memory, no balance problems  Psychiatric: no anxiety, no depression, no insomnia  Hematologic/Lymphatic: no tendency for easy bleeding, no swollen lymph nodes, no tendency for easy bruising  Immunology: has seasonal allergies, no frequent infections, no frequent illnesses  Endocrine: no temperature intolerance    /82   Pulse 78   Temp 98 °F (36.7 °C)   Resp 14   Ht 5' 7\" (1.702 m)   Wt 179 lb (81.2 kg)   LMP 07/07/2012   SpO2 98%   BMI 28.04 kg/m²    Wt Readings from Last 3 Encounters:   02/03/23 179 lb (81.2 kg)   10/07/22 182 lb (82.6 kg)   07/13/22 175 lb (79.4 kg)     Body mass index is 28.04 kg/m².     OBJECTIVE:  Constitutional: no acute distress, well appearing and well nourished  Psychiatric: oriented to person, place and time, judgement and insight and normal, recent and remote memory and intact and mood and affect are normal  Skin: skin and subcutaneous tissue is normal without mass, normal turgor  Head and Face: examination of head and face revealed no abnormalities  Eyes: no lid or conjunctival swelling, erythema or discharge, pupils are normal, equal, round, reactive to light  Ears/Nose: external inspection of ears and nose revealed no abnormalities, hearing is grossly normal  Oropharynx/Mouth/Face: lips, tongue and gums are normal with no lesions, the voice quality was normal  Neck: neck is supple and symmetric, with midline trachea and no masses, thyroid is enlarged  Lymphatics: normal cervical lymph nodes, normal supraclavicular nodes  Pulmonary: no increased work of breathing or signs of respiratory distress, lungs are clear to auscultation  Cardiovascular: normal heart rate and rhythm, normal S1 and S2, no murmurs and pedal pulses and 2+ bilaterally, No edema  Abdomen: abdomen is soft, non-tender with no masses  Musculoskeletal: normal gait and station and exam of the digits and nails are normal  Neurological: normal coordination and normal general cortical function      Lab Review:    Lab Results   Component Value Date/Time    WBC 7.1 07/12/2022 06:32 AM    HGB 13.5 07/12/2022 06:32 AM    HCT 40.9 07/12/2022 06:32 AM    MCV 94.3 07/12/2022 06:32 AM     07/12/2022 06:32 AM     Lab Results   Component Value Date/Time     07/29/2022 10:04 AM    K 4.1 07/29/2022 10:04 AM    K 4.7 07/12/2022 06:32 AM     07/29/2022 10:04 AM    CO2 24 07/29/2022 10:04 AM    BUN 27 07/29/2022 10:04 AM    CREATININE 0.9 07/29/2022 10:04 AM    GLUCOSE 91 07/29/2022 10:04 AM    CALCIUM 8.6 07/29/2022 10:04 AM    PROT 7.0 07/29/2022 10:04 AM    LABALBU 4.3 07/29/2022 10:04 AM    BILITOT 0.6 07/29/2022 10:04 AM    ALKPHOS 67 07/29/2022 10:04 AM    AST 21 07/29/2022 10:04 AM    ALT 16 07/29/2022 10:04 AM    LABGLOM >60 07/29/2022 10:04 AM    GFRAA >60 07/29/2022 10:04 AM    AGRATIO 1.6 07/29/2022 10:04 AM    GLOB 3.0 10/15/2021 10:32 AM     Lab Results   Component Value Date/Time    TSH 2.29 07/29/2022 10:04 AM    FT3 2.7 07/29/2022 10:04 AM     No results found for: LABA1C  No results found for: EAG  No results found for: CHOL  No results found for: TRIG  No results found for: HDL  No results found for: LDLCHOLESTEROL, LDLCALC  No results found for: LABVLDL, VLDL  No results found for: [facility]  Lab Results   Component Value Date/Time    LABMICR Not Indicated 04/25/2022 02:09 PM     Lab Results   Component Value Date/Time    VITD25 54.1 07/29/2022 10:04 AM        ASSESSMENT/PLAN:  1.  Primary hyperparathyroidism ([facility] [address]  Call for renal panel and vitamin D results  Calcium 10.2-9.5-11.0-9.7-8.2-8.6  Parathyroidectomy 9/15/2020  - Comprehensive Metabolic Panel; Future  - Calcium Ionized Serum; Future  - PTH, Intact; Future  - Phosphorus; Future  - Magnesium; Future  - Vitamin D 25 Hydroxy; Future    2. Status post parathyroidectomy  Parathyroidectomy 9/15/2020  4 gland hyperplasia, gland reimplantation into left sternocleidomastoid muscle on 9/15/2020  - Comprehensive Metabolic Panel; Future  - Calcium Ionized Serum; Future  - PTH, Intact; Future  - Phosphorus; Future  - Magnesium; Future  - Vitamin D 25 Hydroxy; Future    3. Hypocalcemia/postsurgical hypoparathyroidism  PTH <1.2-7-10-6.5-7.7-7.7  Calcium 9.5-11.0-9.7-9.5-8.6  25OHvitamin D 38.8-37.7-42.3-50.2-40.9  Uncontrolled postsurgical hypoparathyroidism  Calcitriol 0.25 mcg daily   Continue close monitoring  - calcitRIOL (ROCALTROL) 0.25 MCG capsule; Take 0.25 mcg by mouth daily  - magnesium 400 mg daily was disconnected because of diarrhea  - vitamin D3 (CHOLECALCIFEROL) 25 MCG (1000 UT) TABS tablet; Take 1 tablet by mouth daily   - Tums 2 tablets twice daily  - Vitamin D 25 Hydroxy; Future  - Calcium Ionized Serum; Future  - PTH, Intact; Future  - Phosphorus; Future  - Magnesium; Future  - Comprehensive Metabolic Panel; Future    4. Osteopenia, unspecified location  Has upset stomach, has ulcer history  Recommended Reclast  High fracture risk  6/3/2022  LUMBAR SPINE L1-L4:   T-score: -1.0   BMD in g/cm2: 0.939     LEFT HIP:   Neck T-score: -1.7   Neck BMD in g/cm2: 0.656   Total T-score: -1.1   Total BMD in g/cm2: 0.807     LEFT 33% RADIUS:   T-score: -1.5   BMD in g/cm2: 0.603     FRAX:   FRAX risk factor(s): None   Major osteoporotic fracture risk: 27%   Hip fracture risk:  3.4%   The FRAX calculation is based on patient answers to a questionnaire and can be associated with overestimation or underestimation of fracture risk. Therefore FRAX should be validated by the ordering physician. DEXA January 2020  Monitor bone density  OSTEOPENIA.  T-scores are in the low bone density range, meeting WHO criteria for osteopenia. INTERVAL CHANGE: Not applicable   COMPARISON:  None   DXA machine: Krush (S/N [id number]). 100 [facility] in g/cm2 at 95% confidence: Lumbar spine NA, Total hip NA. FRAX software version: 3.08   LUMBAR SPINE:   T-score for the lumbar spine: -1.7   BMD for the lumbar spine in g/cm2: 0.861   HIP:   T-score for the femoral neck: -1.9   BMD for the femoral neck in g/cm2: 0.634   T-score for the total hip: -1.6   BMD for the total hip in g/cm2: 0.742     FRAX:   FRAX risk factor(s): Tobacco use   Major osteoporotic fracture risk (%): 9.7%   Hip fracture risk(%):  2.0%        - Comprehensive Metabolic Panel; Future  - Vitamin D 25 Hydroxy; Future    5. Multinodular goiter  Thyroid sonogram in 2025 7/5/2022  2 subcm right thyroid nodules  Continue monitoring  TSH 2.27-1.9-3.19-2.33-2.15  -Thyroid sonogram  - T3, Free; Future  - T4, Free; Future  - TSH without Reflex; Future    6. Overweight (BMI 25.0-29. 9)  Diet, exercise    7. Chronic kidney disease stage 3  Creatinine 1.35-0.9  Decreased GFR 42->60  Feet cramps  Follow with nephrologist.      Reviewed and/or ordered clinical lab results Yes  Reviewed and/or ordered radiology tests Yes   Reviewed and/or ordered other diagnostic tests No  Discussed test results with performing physician No  Independently reviewed image, tracing, or specimen No  Made a decision to obtain old records No  Reviewed and summarized old records Yes   Calcium 11-10.8-11.1-10.7-10.8-10.7-7.9, upper limit normal 10.4  Elevated calcium since 2016  25 hydroxy vitamin D 23.7-37  PTH 90.7-81.80-6.08-6.73  TSH 2.15  SURGICAL PATHOLOGY REPORT    Name: Esme Ramsey  EPI#: [id number]  Acct#: [de-identified]    Case #: [id number]    Final Pathologic Diagnosis  A.   Possible left possible superior parathyroid, biopsy:  -     Parathyroid tissue, 0.003 g.    B.  Possible left inferior parathyroid, excision:  -     Hypercellular parathyroid tissue,0.093 g.  -     Thymic tissue. C.  Possible right inferior parathyroid gland, excision:  -     Adipose tissue. D.  Possible right superior parathyroid, excision:  -     Hypercellular parathyroid tissue, 0.164 g.    E.  Possible right inferior parathyroid, excision:  -          Fragments of thyroid and parathyroid tissue, 0.091 g. MCS2/MCS2/9/16/2020 11:54:37  Electronically Calvin MD Kirsty  Specimen(s) Received  A. Possible left possible superior parathyroid  B. Possible left inferior parathyroid  C. Possible right inferior parathyroid gland  D. Possible right superior parathyroid  E.  Possible right inferior parathyroid    Obtained history from other than patient No    Blaine Eaves was counseled regarding symptoms of primary hyperparathyroidism, postsurgical hypoparathyroidism, thyroid disease, multinodular goiter diagnosis, course and complications of disease if inadequately treated, side effects of medications, diagnosis, treatment options, and prognosis, risks, benefits, complications, and alternatives of treatment, labs, imaging and other studies and treatment targets and goals, signs and symptoms of hyperparathyroidism, treatment, side effects of medications, risk of cancer in thyroid nodules. She understands instructions and counseling. Return in about 4 months (around 6/3/2023) for parathyroid, thyroid problems.     Electronically signed by Cinthya Rueda MD on 2/3/2023 at 10:16 AM

## 2023-06-09 ENCOUNTER — OFFICE VISIT (OUTPATIENT)
Dept: ENDOCRINOLOGY | Age: 63
End: 2023-06-09

## 2023-06-09 VITALS
HEIGHT: 67 IN | DIASTOLIC BLOOD PRESSURE: 88 MMHG | RESPIRATION RATE: 14 BRPM | BODY MASS INDEX: 27.62 KG/M2 | WEIGHT: 176 LBS | HEART RATE: 68 BPM | OXYGEN SATURATION: 97 % | SYSTOLIC BLOOD PRESSURE: 137 MMHG | TEMPERATURE: 98 F

## 2023-06-09 DIAGNOSIS — E66.3 OVERWEIGHT (BMI 25.0-29.9): ICD-10-CM

## 2023-06-09 DIAGNOSIS — E21.0 PRIMARY HYPERPARATHYROIDISM (HCC): Primary | ICD-10-CM

## 2023-06-09 DIAGNOSIS — E83.51 HYPOCALCEMIA: ICD-10-CM

## 2023-06-09 DIAGNOSIS — E89.2 STATUS POST PARATHYROIDECTOMY (HCC): ICD-10-CM

## 2023-06-09 DIAGNOSIS — N18.31 STAGE 3A CHRONIC KIDNEY DISEASE (HCC): ICD-10-CM

## 2023-06-09 DIAGNOSIS — E04.2 MULTINODULAR GOITER: ICD-10-CM

## 2023-06-09 DIAGNOSIS — E89.2 POSTSURGICAL HYPOPARATHYROIDISM (HCC): ICD-10-CM

## 2023-06-09 DIAGNOSIS — M85.80 OSTEOPENIA, UNSPECIFIED LOCATION: ICD-10-CM

## 2023-06-09 RX ORDER — CALCITRIOL 0.25 UG/1
CAPSULE, LIQUID FILLED ORAL
Qty: 30 CAPSULE | Refills: 5 | Status: SHIPPED | OUTPATIENT
Start: 2023-06-09

## 2023-06-09 NOTE — PROGRESS NOTES
inferior parathyroid gland, excision:  -     Adipose tissue. D.  Possible right superior parathyroid, excision:  -     Hypercellular parathyroid tissue, 0.164 g.    E.  Possible right inferior parathyroid, excision:  -          Fragments of thyroid and parathyroid tissue, 0.091 g. MCS2/MCS2/9/16/2020 11:54:37  Electronically Caryle Caraway, MD  Specimen(s) Received  A. Possible left possible superior parathyroid  B. Possible left inferior parathyroid  C. Possible right inferior parathyroid gland  D. Possible right superior parathyroid  E.  Possible right inferior parathyroid    Obtained history from other than patient Kellen Simpson was counseled regarding symptoms of primary hyperparathyroidism, postsurgical hypoparathyroidism, thyroid disease, multinodular goiter diagnosis, course and complications of disease if inadequately treated, side effects of medications, diagnosis, treatment options, and prognosis, risks, benefits, complications, and alternatives of treatment, labs, imaging and other studies and treatment targets and goals, signs and symptoms of hyperparathyroidism, treatment, side effects of medications, risk of cancer in thyroid nodules. She understands instructions and counseling. Total time spent for this encounter 30 minutes    Return in about 6 months (around 12/9/2023) for thyroid problems.     Electronically signed by Stefain Joy MD on 6/9/2023 at 11:39 AM

## 2023-11-30 ENCOUNTER — HOSPITAL ENCOUNTER (OUTPATIENT)
Age: 63
Discharge: HOME | End: 2023-11-30
Payer: COMMERCIAL

## 2023-11-30 DIAGNOSIS — E83.51 HYPOCALCEMIA: ICD-10-CM

## 2023-11-30 DIAGNOSIS — E89.2 POSTSURGICAL HYPOPARATHYROIDISM (HCC): ICD-10-CM

## 2023-11-30 DIAGNOSIS — E21.0 PRIMARY HYPERPARATHYROIDISM (HCC): ICD-10-CM

## 2023-11-30 LAB
25(OH)D3 SERPL-MCNC: 45.5 NG/ML
ALBUMIN SERPL-MCNC: 4.6 G/DL (ref 3.4–5)
ALBUMIN/GLOB SERPL: 1.5 {RATIO} (ref 1.1–2.2)
ALP SERPL-CCNC: 65 U/L (ref 40–129)
ALT SERPL-CCNC: 18 U/L (ref 10–40)
ANION GAP SERPL CALCULATED.3IONS-SCNC: 12 MMOL/L (ref 3–16)
AST SERPL-CCNC: 21 U/L (ref 15–37)
BILIRUB SERPL-MCNC: 0.5 MG/DL (ref 0–1)
BUN SERPL-MCNC: 27 MG/DL (ref 7–20)
CALCIUM SERPL-MCNC: 8.7 MG/DL (ref 8.3–10.6)
CHLORIDE SERPL-SCNC: 103 MMOL/L (ref 99–110)
CO2 SERPL-SCNC: 26 MMOL/L (ref 21–32)
CREAT SERPL-MCNC: 1 MG/DL (ref 0.6–1.2)
GFR SERPLBLD CREATININE-BSD FMLA CKD-EPI: >60 ML/MIN/{1.73_M2}
GLUCOSE SERPL-MCNC: 95 MG/DL (ref 70–99)
MAGNESIUM SERPL-MCNC: 2.2 MG/DL (ref 1.8–2.4)
PHOSPHATE SERPL-MCNC: 4.3 MG/DL (ref 2.5–4.9)
POTASSIUM SERPL-SCNC: 4.1 MMOL/L (ref 3.5–5.1)
PROT SERPL-MCNC: 7.6 G/DL (ref 6.4–8.2)
PTH-INTACT SERPL-MCNC: 7 PG/ML (ref 14–72)
SODIUM SERPL-SCNC: 141 MMOL/L (ref 136–145)
T3FREE SERPL-MCNC: 2.8 PG/ML (ref 2.3–4.2)
T4 FREE SERPL-MCNC: 1.1 NG/DL (ref 0.9–1.8)
TSH SERPL DL<=0.005 MIU/L-ACNC: 1.95 UIU/ML (ref 0.27–4.2)

## 2023-11-30 PROCEDURE — 84443 ASSAY THYROID STIM HORMONE: CPT

## 2023-11-30 PROCEDURE — 84481 FREE ASSAY (FT-3): CPT

## 2023-11-30 PROCEDURE — 83735 ASSAY OF MAGNESIUM: CPT

## 2023-11-30 PROCEDURE — 84100 ASSAY OF PHOSPHORUS: CPT

## 2023-11-30 PROCEDURE — 36415 COLL VENOUS BLD VENIPUNCTURE: CPT

## 2023-11-30 PROCEDURE — 84439 ASSAY OF FREE THYROXINE: CPT

## 2023-11-30 PROCEDURE — 82330 ASSAY OF CALCIUM: CPT

## 2023-11-30 PROCEDURE — 80053 COMPREHEN METABOLIC PANEL: CPT

## 2023-11-30 PROCEDURE — 82306 VITAMIN D 25 HYDROXY: CPT

## 2023-11-30 PROCEDURE — 83970 ASSAY OF PARATHORMONE: CPT

## 2023-12-02 LAB
CA-I ADJ PH7.4 SERPL-SCNC: 1.15 MMOL/L (ref 1.09–1.3)
CA-I SERPL ISE-SCNC: 1.11 MMOL/L (ref 1.09–1.3)

## 2023-12-15 ENCOUNTER — OFFICE VISIT (OUTPATIENT)
Dept: ENDOCRINOLOGY | Age: 63
End: 2023-12-15

## 2023-12-15 VITALS
TEMPERATURE: 98 F | DIASTOLIC BLOOD PRESSURE: 89 MMHG | HEIGHT: 67 IN | OXYGEN SATURATION: 100 % | HEART RATE: 73 BPM | SYSTOLIC BLOOD PRESSURE: 146 MMHG | RESPIRATION RATE: 14 BRPM | WEIGHT: 176 LBS | BODY MASS INDEX: 27.62 KG/M2

## 2023-12-15 DIAGNOSIS — E83.51 HYPOCALCEMIA: ICD-10-CM

## 2023-12-15 DIAGNOSIS — E89.2 POSTSURGICAL HYPOPARATHYROIDISM (HCC): ICD-10-CM

## 2023-12-15 DIAGNOSIS — E66.3 OVERWEIGHT (BMI 25.0-29.9): ICD-10-CM

## 2023-12-15 DIAGNOSIS — E04.2 MULTINODULAR GOITER: ICD-10-CM

## 2023-12-15 DIAGNOSIS — E89.2 STATUS POST PARATHYROIDECTOMY (HCC): ICD-10-CM

## 2023-12-15 DIAGNOSIS — E21.0 PRIMARY HYPERPARATHYROIDISM (HCC): Primary | ICD-10-CM

## 2023-12-15 DIAGNOSIS — M85.80 OSTEOPENIA, UNSPECIFIED LOCATION: ICD-10-CM

## 2023-12-15 RX ORDER — CALCITRIOL 0.25 UG/1
CAPSULE, LIQUID FILLED ORAL
Qty: 30 CAPSULE | Refills: 5 | Status: SHIPPED | OUTPATIENT
Start: 2023-12-15

## 2023-12-15 NOTE — PROGRESS NOTES
SUBJECTIVE:  Missy Rao is a 70 y.o. female who is being evaluated for hyperparathyroidism. 1. Primary hyperparathyroidism ([address])    This started in 2016. Patient was diagnosed with hyperparathyroidism. The problem has been gradually worsening. Patient started medication in 2020. Currently patient is on: Calcitriol, vitamin D, magnesium, Tums. Misses  0 doses a month. Current complaints: No tingling, but has foot cramps, fatigue  Hot flashes 1-2 times daily    2. Status post parathyroidectomy  Parathyroidectomy 9/15/2020  4 gland hyperplasia, gland reimplantation into left sternocleidomastoid muscle on 9/15/2020    3. Hypocalcemia/postsurgical hypoparathyroidism  Patient has foot cramps  Patient developed hypocalcemia after parathyroid surgery. Tums 1000 mg 2 tablets 2 times a day, calcitriol 0.25 mcg daily, vitamin D 1000 international units daily. Magnesium caused diarrhea. 4. Osteopenia, unspecified location  Had rib fractures  Mother had hip fracture  No steroids long term. No RA  Smoker    5. Multinodular goiter  History of obstructive symptoms: difficulty swallowing No, changes in voice/hoarseness Yes. History of radiation to patient's neck: No  Resent iodine exposure: No  Family history includes mother had neck surgery  Family history of thyroid cancer: No    6. Overweight (BMI 25.0-29. 9)  Eats healthy, active      Site: [facility] Dove #: [id number] #: [id number]: BTLRBCAccount #: [de-identified] #: [id number]: [id number]: BONE DENSITY - DEXAExam Date/Time: 05/24/2022 01:30 PMAdmitting Diagnosis: Reason for Exam:        Dictated by: Denita Bojorquez BIRD: 06/03/2022 09:47 AMT: This document is confidential medical information. Unauthorized disclosure or use of this information is prohibited by law. If you are not the intended recipient of this document, please advise us by    calling immediately [phone].    Impression/Conclusion below       HISTORY: Primary

## 2024-02-05 DIAGNOSIS — E89.2 POSTSURGICAL HYPOPARATHYROIDISM (HCC): ICD-10-CM

## 2024-02-05 DIAGNOSIS — E89.2 STATUS POST PARATHYROIDECTOMY (HCC): ICD-10-CM

## 2024-02-05 DIAGNOSIS — E04.2 MULTINODULAR GOITER: ICD-10-CM

## 2024-02-05 DIAGNOSIS — E21.0 PRIMARY HYPERPARATHYROIDISM (HCC): ICD-10-CM

## 2024-02-05 DIAGNOSIS — E83.51 HYPOCALCEMIA: ICD-10-CM

## 2024-02-05 DIAGNOSIS — M85.80 OSTEOPENIA, UNSPECIFIED LOCATION: ICD-10-CM

## 2024-02-05 RX ORDER — CALCITRIOL 0.25 UG/1
CAPSULE, LIQUID FILLED ORAL
Qty: 30 CAPSULE | Refills: 5 | Status: SHIPPED | OUTPATIENT
Start: 2024-02-05

## 2024-04-19 ENCOUNTER — OFFICE VISIT (OUTPATIENT)
Dept: ENDOCRINOLOGY | Age: 64
End: 2024-04-19

## 2024-04-19 VITALS
BODY MASS INDEX: 28.25 KG/M2 | OXYGEN SATURATION: 96 % | RESPIRATION RATE: 14 BRPM | HEART RATE: 64 BPM | TEMPERATURE: 98 F | DIASTOLIC BLOOD PRESSURE: 90 MMHG | SYSTOLIC BLOOD PRESSURE: 131 MMHG | WEIGHT: 180 LBS | HEIGHT: 67 IN

## 2024-04-19 DIAGNOSIS — Z98.890 STATUS POST PARATHYROIDECTOMY: ICD-10-CM

## 2024-04-19 DIAGNOSIS — Z90.89 STATUS POST PARATHYROIDECTOMY: ICD-10-CM

## 2024-04-19 DIAGNOSIS — E83.51 HYPOCALCEMIA: ICD-10-CM

## 2024-04-19 DIAGNOSIS — E21.0 PRIMARY HYPERPARATHYROIDISM (HCC): Primary | ICD-10-CM

## 2024-04-19 DIAGNOSIS — E04.2 MULTINODULAR GOITER: ICD-10-CM

## 2024-04-19 DIAGNOSIS — M85.80 OSTEOPENIA, UNSPECIFIED LOCATION: ICD-10-CM

## 2024-04-19 DIAGNOSIS — E66.3 OVERWEIGHT (BMI 25.0-29.9): ICD-10-CM

## 2024-04-19 DIAGNOSIS — E89.2 POSTSURGICAL HYPOPARATHYROIDISM (HCC): ICD-10-CM

## 2024-04-19 RX ORDER — MELATONIN
1000 DAILY
Qty: 100 TABLET | Refills: 0
Start: 2024-04-19

## 2024-04-19 RX ORDER — CALCITRIOL 0.25 UG/1
CAPSULE, LIQUID FILLED ORAL
Qty: 30 CAPSULE | Refills: 5 | Status: SHIPPED | OUTPATIENT
Start: 2024-04-19

## 2024-04-19 NOTE — PROGRESS NOTES
inferior parathyroid gland, excision:  -     Adipose tissue.    D.  Possible right superior parathyroid, excision:  -     Hypercellular parathyroid tissue, 0.164 g.    E.  Possible right inferior parathyroid, excision:  -          Fragments of thyroid and parathyroid tissue, 0.091 g.   MCS2/MCS2/9/16/2020 11:54:37  Electronically Signed Out            Marylu Monroe MD  Specimen(s) Received  A.  Possible left possible superior parathyroid  B.  Possible left inferior parathyroid  C.  Possible right inferior parathyroid gland  D.  Possible right superior parathyroid  E.  Possible right inferior parathyroid    Obtained history from other than patient Kellen Kumar was counseled regarding symptoms of primary hyperparathyroidism, postsurgical hypoparathyroidism, thyroid disease, multinodular goiter diagnosis, course and complications of disease if inadequately treated, side effects of medications, diagnosis, treatment options, and prognosis, risks, benefits, complications, and alternatives of treatment, labs, imaging and other studies and treatment targets and goals, signs and symptoms of hyperparathyroidism, treatment, side effects of medications, risk of cancer in thyroid nodules.  She understands instructions and counseling.    Total time spent for this encounter 30 minutes    Return in about 4 months (around 8/19/2024) for hypocalcemia, thyroid problems.    Electronically signed by Rand Roman MD on 4/19/2024 at 11:41 AM

## 2024-04-19 NOTE — ADDENDUM NOTE
Addended by: TIM GALLAGHER on: 4/19/2024 11:45 AM     Modules accepted: Orders, Level of Service

## 2024-06-07 ENCOUNTER — HOSPITAL ENCOUNTER (OUTPATIENT)
Dept: GENERAL RADIOLOGY | Age: 64
Discharge: HOME | End: 2024-06-07
Attending: INTERNAL MEDICINE
Payer: COMMERCIAL

## 2024-06-07 DIAGNOSIS — M85.80 OSTEOPENIA, UNSPECIFIED LOCATION: ICD-10-CM

## 2024-06-07 DIAGNOSIS — E83.51 HYPOCALCEMIA: ICD-10-CM

## 2024-06-07 DIAGNOSIS — E21.0 PRIMARY HYPERPARATHYROIDISM (HCC): ICD-10-CM

## 2024-06-07 DIAGNOSIS — E89.2 POSTSURGICAL HYPOPARATHYROIDISM (HCC): ICD-10-CM

## 2024-06-07 PROCEDURE — 77080 DXA BONE DENSITY AXIAL: CPT

## 2024-06-08 ENCOUNTER — TELEPHONE (OUTPATIENT)
Dept: ENDOCRINOLOGY | Age: 64
End: 2024-06-08

## 2024-06-08 DIAGNOSIS — Z87.11 HISTORY OF GASTRIC ULCER: Primary | ICD-10-CM

## 2024-06-09 NOTE — TELEPHONE ENCOUNTER
T-score spine -0.8, T-score left femoral neck -1.7, left total hip -1.3, right femoral neck -1.4, right total hip -0.7, right forearm -1.6.  Major osteoporotic fracture: 21%  Hip fracture: 3.7%    Please inform patient:  DEXA scan was done in different place, direct comparison is not possible.  Overall T-scores appear to be similar.  Fracture risk remains high.  With this decision regarding Reclast?    Also, we can discuss again during appointment

## 2024-06-11 PROBLEM — Z87.11 HISTORY OF GASTRIC ULCER: Status: ACTIVE | Noted: 2024-06-11

## 2024-06-11 NOTE — TELEPHONE ENCOUNTER
Pt called back, gave pt verbatim message.     Pt is requesting to speak with Dr. Roman only to further discuss her results and risk of fractures     Please advise   CB# [phone]

## 2024-06-11 NOTE — TELEPHONE ENCOUNTER
Spoke with patient and explained fracture risk.  Discussed Reclast, side effects and benefits.  Patient will think and let me know.  She is a smoker and has family history of mother having hip fracture.  If agreeable, we will submit for Reclast.  Patient has a history of stomach ulcer, not a candidate for oral biphosphonates.  Still has stomach issues.

## 2024-07-26 ENCOUNTER — HOSPITAL ENCOUNTER (OUTPATIENT)
Age: 64
Discharge: HOME | End: 2024-07-26
Payer: COMMERCIAL

## 2024-07-26 DIAGNOSIS — Z90.89 STATUS POST PARATHYROIDECTOMY: ICD-10-CM

## 2024-07-26 DIAGNOSIS — E83.51 HYPOCALCEMIA: ICD-10-CM

## 2024-07-26 DIAGNOSIS — Z98.890 STATUS POST PARATHYROIDECTOMY: ICD-10-CM

## 2024-07-26 DIAGNOSIS — M85.80 OSTEOPENIA, UNSPECIFIED LOCATION: ICD-10-CM

## 2024-07-26 DIAGNOSIS — E04.2 MULTINODULAR GOITER: ICD-10-CM

## 2024-07-26 DIAGNOSIS — E66.3 OVERWEIGHT (BMI 25.0-29.9): ICD-10-CM

## 2024-07-26 DIAGNOSIS — E89.2 POSTSURGICAL HYPOPARATHYROIDISM (HCC): ICD-10-CM

## 2024-07-26 DIAGNOSIS — E21.0 PRIMARY HYPERPARATHYROIDISM (HCC): ICD-10-CM

## 2024-07-26 LAB
25(OH)D3 SERPL-MCNC: 48.8 NG/ML
ALBUMIN SERPL-MCNC: 4.6 G/DL (ref 3.4–5)
ALBUMIN/GLOB SERPL: 1.6 {RATIO} (ref 1.1–2.2)
ALP SERPL-CCNC: 76 U/L (ref 40–129)
ALT SERPL-CCNC: 15 U/L (ref 10–40)
ANION GAP SERPL CALCULATED.3IONS-SCNC: 10 MMOL/L (ref 3–16)
AST SERPL-CCNC: 22 U/L (ref 15–37)
BILIRUB SERPL-MCNC: 0.6 MG/DL (ref 0–1)
BUN SERPL-MCNC: 30 MG/DL (ref 7–20)
CALCIUM SERPL-MCNC: 8.6 MG/DL (ref 8.3–10.6)
CHLORIDE SERPL-SCNC: 102 MMOL/L (ref 99–110)
CO2 SERPL-SCNC: 27 MMOL/L (ref 21–32)
CREAT SERPL-MCNC: 0.9 MG/DL (ref 0.6–1.2)
GFR SERPLBLD CREATININE-BSD FMLA CKD-EPI: 71 ML/MIN/{1.73_M2}
GLUCOSE SERPL-MCNC: 88 MG/DL (ref 70–99)
MAGNESIUM SERPL-MCNC: 2 MG/DL (ref 1.8–2.4)
PHOSPHATE SERPL-MCNC: 3.3 MG/DL (ref 2.5–4.9)
POTASSIUM SERPL-SCNC: 4.3 MMOL/L (ref 3.5–5.1)
PROT SERPL-MCNC: 7.5 G/DL (ref 6.4–8.2)
PTH-INTACT SERPL-MCNC: 8 PG/ML (ref 14–72)
SODIUM SERPL-SCNC: 139 MMOL/L (ref 136–145)
T4 FREE SERPL-MCNC: 1.2 NG/DL (ref 0.9–1.8)
TSH SERPL DL<=0.005 MIU/L-ACNC: 2.96 UIU/ML (ref 0.27–4.2)

## 2024-07-26 PROCEDURE — 84100 ASSAY OF PHOSPHORUS: CPT

## 2024-07-26 PROCEDURE — 80053 COMPREHEN METABOLIC PANEL: CPT

## 2024-07-26 PROCEDURE — 84443 ASSAY THYROID STIM HORMONE: CPT

## 2024-07-26 PROCEDURE — 82330 ASSAY OF CALCIUM: CPT

## 2024-07-26 PROCEDURE — 83735 ASSAY OF MAGNESIUM: CPT

## 2024-07-26 PROCEDURE — 84439 ASSAY OF FREE THYROXINE: CPT

## 2024-07-26 PROCEDURE — 82306 VITAMIN D 25 HYDROXY: CPT

## 2024-07-26 PROCEDURE — 83970 ASSAY OF PARATHORMONE: CPT

## 2024-07-26 PROCEDURE — 36415 COLL VENOUS BLD VENIPUNCTURE: CPT

## 2024-07-28 LAB
CA-I ADJ PH7.4 SERPL-SCNC: 1.15 MMOL/L (ref 1.09–1.3)
CA-I SERPL ISE-SCNC: 1.1 MMOL/L (ref 1.09–1.3)

## 2024-08-16 ENCOUNTER — OFFICE VISIT (OUTPATIENT)
Dept: ENDOCRINOLOGY | Age: 64
End: 2024-08-16
Payer: COMMERCIAL

## 2024-08-16 VITALS
OXYGEN SATURATION: 97 % | TEMPERATURE: 98 F | SYSTOLIC BLOOD PRESSURE: 130 MMHG | BODY MASS INDEX: 27.31 KG/M2 | WEIGHT: 174 LBS | HEIGHT: 67 IN | RESPIRATION RATE: 14 BRPM | DIASTOLIC BLOOD PRESSURE: 83 MMHG | HEART RATE: 69 BPM

## 2024-08-16 DIAGNOSIS — M81.0 AGE-RELATED OSTEOPOROSIS WITHOUT CURRENT PATHOLOGICAL FRACTURE: ICD-10-CM

## 2024-08-16 DIAGNOSIS — E04.2 MULTINODULAR GOITER: ICD-10-CM

## 2024-08-16 DIAGNOSIS — E89.2 POSTSURGICAL HYPOPARATHYROIDISM (HCC): Primary | ICD-10-CM

## 2024-08-16 DIAGNOSIS — E66.3 OVERWEIGHT (BMI 25.0-29.9): ICD-10-CM

## 2024-08-16 DIAGNOSIS — Z90.89 STATUS POST PARATHYROIDECTOMY: ICD-10-CM

## 2024-08-16 DIAGNOSIS — Z98.890 STATUS POST PARATHYROIDECTOMY: ICD-10-CM

## 2024-08-16 DIAGNOSIS — Z86.39 HISTORY OF PRIMARY HYPERPARATHYROIDISM: ICD-10-CM

## 2024-08-16 PROCEDURE — 1124F ACP DISCUSS-NO DSCNMKR DOCD: CPT | Performed by: INTERNAL MEDICINE

## 2024-08-16 PROCEDURE — 99215 OFFICE O/P EST HI 40 MIN: CPT | Performed by: INTERNAL MEDICINE

## 2024-08-16 PROCEDURE — 3075F SYST BP GE 130 - 139MM HG: CPT | Performed by: INTERNAL MEDICINE

## 2024-08-16 PROCEDURE — 3079F DIAST BP 80-89 MM HG: CPT | Performed by: INTERNAL MEDICINE

## 2024-08-16 NOTE — PROGRESS NOTES
patient about side effects of Reclast, including osteonecrosis of the jaw, atypical femur fractures, flu type symptoms after infusion, bone pain.  Has history of stomach ulcer  Has frequent upset stomach.  Medical history included GERD.  Patient is not a candidate for oral biphosphonates.  High fracture risk  Had rib fractures  Mother had hip fracture  No steroids long term.  No RA  Smoker, not ready to quit.  Counseled patient about smoking cessation.  Counseled patient about smoking and fracture risk.    For patients with low bone mass (osteopenia; defined as a T-score between -1.0 and -2.5) a clinical diagnosis of osteoporosis may be made based on high estimated fracture risk. We use the Fracture Risk Assessment Tool to estimate fracture risk. Clinical diagnosis of osteoporosis may be made when the FRAX 10-year probability of major osteoporotic fracture (hip, clinical spine, proximal humerus, or forearm) is ?20 percent or the 10-year probability of hip fracture is ?3 percent.    6/7/2024  DXA was done on different equipment.  LUMBAR SPINE: L1-L4  T-score: -0.8   LEFT TOTAL HIP:  T-score: -1.3   LEFT FEMORAL NECK:  T-score: -1.7  RIGHT TOTAL HIP:  T-score: -0.7   RIGHT FEMORAL NECK:  T-score: -1.4   RIGHT FOREARM (RADIUS 1/3)  T-score: -1.6   FRAX 10-YEAR PROBABILITY OF FRACTURE:   Major osteoporotic fracture: 21%  Hip fracture: 3.7%    6/3/2022  LUMBAR SPINE L1-L4:   T-score: -1.0   BMD in g/cm2: 0.939   LEFT HIP:   Neck T-score: -1.7   Neck BMD in g/cm2: 0.656   Total T-score: -1.1   Total BMD in g/cm2: 0.807   LEFT 33% RADIUS:   T-score: -1.5   BMD in g/cm2: 0.603   Major osteoporotic fracture risk: 27%   Hip fracture risk:  3.4%      DEXA January 2020  OSTEOPENIA. T-scores are in the low bone density range, meeting WHO criteria for osteopenia.   INTERVAL CHANGE: Not applicable   LUMBAR SPINE:   T-score for the lumbar spine: -1.7   HIP:   T-score for the femoral neck: -1.9   T-score for the total hip: -1.6

## 2024-08-18 PROBLEM — Z86.39 HISTORY OF PRIMARY HYPERPARATHYROIDISM: Status: ACTIVE | Noted: 2024-08-18

## 2024-08-18 PROBLEM — M81.0 AGE-RELATED OSTEOPOROSIS WITHOUT CURRENT PATHOLOGICAL FRACTURE: Status: ACTIVE | Noted: 2024-08-18

## 2024-08-18 RX ORDER — SODIUM CHLORIDE 9 MG/ML
5-250 INJECTION, SOLUTION INTRAVENOUS PRN
OUTPATIENT
Start: 2024-08-19

## 2024-08-18 RX ORDER — ONDANSETRON 2 MG/ML
8 INJECTION INTRAMUSCULAR; INTRAVENOUS
OUTPATIENT
Start: 2024-08-19

## 2024-08-18 RX ORDER — FAMOTIDINE 10 MG/ML
20 INJECTION, SOLUTION INTRAVENOUS
OUTPATIENT
Start: 2024-08-19

## 2024-08-18 RX ORDER — ACETAMINOPHEN 325 MG/1
650 TABLET ORAL
OUTPATIENT
Start: 2024-08-19

## 2024-08-18 RX ORDER — ALBUTEROL SULFATE 90 UG/1
4 AEROSOL, METERED RESPIRATORY (INHALATION) PRN
OUTPATIENT
Start: 2024-08-19

## 2024-08-18 RX ORDER — DIPHENHYDRAMINE HYDROCHLORIDE 50 MG/ML
50 INJECTION INTRAMUSCULAR; INTRAVENOUS
OUTPATIENT
Start: 2024-08-19

## 2024-08-18 RX ORDER — ZOLEDRONIC ACID 5 MG/100ML
5 INJECTION, SOLUTION INTRAVENOUS ONCE
OUTPATIENT
Start: 2024-08-19 | End: 2024-08-19

## 2024-08-18 RX ORDER — HEPARIN SODIUM (PORCINE) LOCK FLUSH IV SOLN 100 UNIT/ML 100 UNIT/ML
500 SOLUTION INTRAVENOUS PRN
OUTPATIENT
Start: 2024-08-19

## 2024-08-18 RX ORDER — SODIUM CHLORIDE 9 MG/ML
INJECTION, SOLUTION INTRAVENOUS CONTINUOUS
OUTPATIENT
Start: 2024-08-19

## 2024-08-18 RX ORDER — EPINEPHRINE 1 MG/ML
0.3 INJECTION, SOLUTION, CONCENTRATE INTRAVENOUS PRN
OUTPATIENT
Start: 2024-08-19

## 2024-08-18 RX ORDER — SODIUM CHLORIDE 0.9 % (FLUSH) 0.9 %
5-40 SYRINGE (ML) INJECTION PRN
OUTPATIENT
Start: 2024-08-19

## 2024-10-04 ENCOUNTER — HOSPITAL ENCOUNTER (OUTPATIENT)
Dept: ONCOLOGY | Age: 64
Setting detail: INFUSION SERIES
Discharge: HOME | End: 2024-10-04
Payer: COMMERCIAL

## 2024-10-04 VITALS
DIASTOLIC BLOOD PRESSURE: 82 MMHG | OXYGEN SATURATION: 99 % | RESPIRATION RATE: 16 BRPM | SYSTOLIC BLOOD PRESSURE: 127 MMHG | HEART RATE: 62 BPM | TEMPERATURE: 97.9 F

## 2024-10-04 DIAGNOSIS — M81.0 AGE-RELATED OSTEOPOROSIS WITHOUT CURRENT PATHOLOGICAL FRACTURE: Primary | ICD-10-CM

## 2024-10-04 LAB
ALBUMIN SERPL-MCNC: 4.3 G/DL (ref 3.4–5)
ALBUMIN/GLOB SERPL: 1.4 {RATIO} (ref 1.1–2.2)
ALP SERPL-CCNC: 79 U/L (ref 40–129)
ALT SERPL-CCNC: 15 U/L (ref 10–40)
ANION GAP SERPL CALCULATED.3IONS-SCNC: 12 MMOL/L (ref 3–16)
AST SERPL-CCNC: 23 U/L (ref 15–37)
BILIRUB SERPL-MCNC: 0.6 MG/DL (ref 0–1)
BUN SERPL-MCNC: 21 MG/DL (ref 7–20)
CALCIUM SERPL-MCNC: 8.9 MG/DL (ref 8.3–10.6)
CHLORIDE SERPL-SCNC: 103 MMOL/L (ref 99–110)
CO2 SERPL-SCNC: 26 MMOL/L (ref 21–32)
CREAT SERPL-MCNC: 0.8 MG/DL (ref 0.6–1.2)
GFR SERPLBLD CREATININE-BSD FMLA CKD-EPI: 81 ML/MIN/{1.73_M2}
GLUCOSE SERPL-MCNC: 103 MG/DL (ref 70–99)
POTASSIUM SERPL-SCNC: 4.2 MMOL/L (ref 3.5–5.1)
PROT SERPL-MCNC: 7.4 G/DL (ref 6.4–8.2)
SODIUM SERPL-SCNC: 141 MMOL/L (ref 136–145)

## 2024-10-04 PROCEDURE — 80053 COMPREHEN METABOLIC PANEL: CPT

## 2024-10-04 PROCEDURE — 6360000002 HC RX W HCPCS: Performed by: INTERNAL MEDICINE

## 2024-10-04 PROCEDURE — 96365 THER/PROPH/DIAG IV INF INIT: CPT

## 2024-10-04 RX ORDER — SODIUM CHLORIDE 9 MG/ML
5-250 INJECTION, SOLUTION INTRAVENOUS PRN
OUTPATIENT
Start: 2025-09-28

## 2024-10-04 RX ORDER — DIPHENHYDRAMINE HYDROCHLORIDE 50 MG/ML
50 INJECTION INTRAMUSCULAR; INTRAVENOUS
OUTPATIENT
Start: 2025-09-28

## 2024-10-04 RX ORDER — ALBUTEROL SULFATE 90 UG/1
4 INHALANT RESPIRATORY (INHALATION) PRN
OUTPATIENT
Start: 2025-09-28

## 2024-10-04 RX ORDER — HEPARIN 100 UNIT/ML
500 SYRINGE INTRAVENOUS PRN
OUTPATIENT
Start: 2025-09-28

## 2024-10-04 RX ORDER — SODIUM CHLORIDE 0.9 % (FLUSH) 0.9 %
5-40 SYRINGE (ML) INJECTION PRN
OUTPATIENT
Start: 2025-09-28

## 2024-10-04 RX ORDER — ONDANSETRON 2 MG/ML
8 INJECTION INTRAMUSCULAR; INTRAVENOUS
OUTPATIENT
Start: 2025-09-28

## 2024-10-04 RX ORDER — EPINEPHRINE 1 MG/ML
0.3 INJECTION, SOLUTION INTRAMUSCULAR; SUBCUTANEOUS PRN
OUTPATIENT
Start: 2025-09-28

## 2024-10-04 RX ORDER — ACETAMINOPHEN 325 MG/1
650 TABLET ORAL
OUTPATIENT
Start: 2025-09-28

## 2024-10-04 RX ORDER — ZOLEDRONIC ACID 0.05 MG/ML
5 INJECTION, SOLUTION INTRAVENOUS ONCE
Status: COMPLETED | OUTPATIENT
Start: 2024-10-04 | End: 2024-10-04

## 2024-10-04 RX ORDER — SODIUM CHLORIDE 9 MG/ML
INJECTION, SOLUTION INTRAVENOUS CONTINUOUS
OUTPATIENT
Start: 2025-09-28

## 2024-10-04 RX ORDER — ZOLEDRONIC ACID 0.05 MG/ML
5 INJECTION, SOLUTION INTRAVENOUS ONCE
OUTPATIENT
Start: 2025-09-28 | End: 2025-09-28

## 2024-10-04 RX ADMIN — ZOLEDRONIC ACID 5 MG: 5 INJECTION INTRAVENOUS at 12:26

## 2024-10-04 NOTE — PROGRESS NOTES
Pt seen and assessed at [facility] today for Reclast infusion per orders from Dr. Roman.  Infused per [facility] policy.  Monitoring completed for infusion reactions - see flowsheet.  Pt tolerated infusion well and without incident.  Pt verbalizes understanding of discharge instructions.  Discharged ambulatory to home with friend.

## 2024-12-13 ENCOUNTER — OFFICE VISIT (OUTPATIENT)
Dept: ENDOCRINOLOGY | Age: 64
End: 2024-12-13

## 2024-12-13 VITALS
TEMPERATURE: 98 F | DIASTOLIC BLOOD PRESSURE: 75 MMHG | WEIGHT: 176 LBS | SYSTOLIC BLOOD PRESSURE: 118 MMHG | BODY MASS INDEX: 27.62 KG/M2 | RESPIRATION RATE: 14 BRPM | HEART RATE: 71 BPM | HEIGHT: 67 IN | OXYGEN SATURATION: 98 %

## 2024-12-13 DIAGNOSIS — M85.80 OSTEOPENIA, UNSPECIFIED LOCATION: ICD-10-CM

## 2024-12-13 DIAGNOSIS — M81.0 AGE-RELATED OSTEOPOROSIS WITHOUT CURRENT PATHOLOGICAL FRACTURE: ICD-10-CM

## 2024-12-13 DIAGNOSIS — E04.2 MULTINODULAR GOITER: ICD-10-CM

## 2024-12-13 DIAGNOSIS — Z98.890 STATUS POST PARATHYROIDECTOMY: ICD-10-CM

## 2024-12-13 DIAGNOSIS — E89.2 POSTSURGICAL HYPOPARATHYROIDISM (HCC): Primary | ICD-10-CM

## 2024-12-13 DIAGNOSIS — E83.51 HYPOCALCEMIA: ICD-10-CM

## 2024-12-13 DIAGNOSIS — E21.0 PRIMARY HYPERPARATHYROIDISM (HCC): ICD-10-CM

## 2024-12-13 DIAGNOSIS — E66.3 OVERWEIGHT (BMI 25.0-29.9): ICD-10-CM

## 2024-12-13 DIAGNOSIS — Z86.39 HISTORY OF PRIMARY HYPERPARATHYROIDISM: ICD-10-CM

## 2024-12-13 DIAGNOSIS — Z90.89 STATUS POST PARATHYROIDECTOMY: ICD-10-CM

## 2024-12-13 RX ORDER — CALCITRIOL 0.25 UG/1
CAPSULE, LIQUID FILLED ORAL
Qty: 30 CAPSULE | Refills: 5 | Status: SHIPPED | OUTPATIENT
Start: 2024-12-13

## 2024-12-13 NOTE — PROGRESS NOTES
SUBJECTIVE:  Clara Kumar is a 64 y.o. female who is being evaluated for hypoparathyroidism.     1. Postsurgical hypoparathyroidism (HCC)    Current complaints: No tingling, but has foot cramps, fatigue  Hot flashes 1-2 times daily    Patient developed hypocalcemia after parathyroid surgery.  Tums 1000 mg 2 tablets 2 times a day, calcitriol 0.25 mcg daily, vitamin D 1000 international units daily.  Magnesium caused diarrhea.    2. Status post parathyroidectomy  Parathyroidectomy 9/15/2020  4 gland hyperplasia, gland reimplantation into left sternocleidomastoid muscle on 9/15/2020    3. History of primary hyperparathyroidism   This started in 2016. Patient was diagnosed with hyperparathyroidism. The problem has been gradually worsening.   Patient started medication in 2020. Currently patient is on: Calcitriol, vitamin D, magnesium, Tums. Misses  0 doses a month.    4. Osteoporosis  Had rib fractures  Mother had hip fracture  No steroids long term.  No RA  Smoker    5. Multinodular goiter  History of obstructive symptoms: difficulty swallowing No, changes in voice/hoarseness Yes.  History of radiation to patient's neck: No  Resent iodine exposure: No  Family history includes mother had neck surgery  Family history of thyroid cancer: No    6. Overweight (BMI 25.0-29.9)  Eats healthy, active      SURGICAL PATHOLOGY REPORT    Final Pathologic Diagnosis  A.  Possible left possible superior parathyroid, biopsy:  -     Parathyroid tissue, 0.003 g.    B.  Possible left inferior parathyroid, excision:  -     Hypercellular parathyroid tissue,0.093 g.  -     Thymic tissue.    C.  Possible right inferior parathyroid gland, excision:  -     Adipose tissue.    D.  Possible right superior parathyroid, excision:  -     Hypercellular parathyroid tissue, 0.164 g.    E.  Possible right inferior parathyroid, excision:  -          Fragments of thyroid and parathyroid tissue, 0.091 g.   Specimen(s) Received  A.  Possible left possible

## 2025-08-06 ENCOUNTER — HOSPITAL ENCOUNTER (OUTPATIENT)
Age: 65
Discharge: HOME | End: 2025-08-06
Payer: COMMERCIAL

## 2025-08-06 DIAGNOSIS — Z90.89 STATUS POST PARATHYROIDECTOMY: ICD-10-CM

## 2025-08-06 DIAGNOSIS — E89.2 POSTSURGICAL HYPOPARATHYROIDISM: ICD-10-CM

## 2025-08-06 DIAGNOSIS — M85.80 OSTEOPENIA, UNSPECIFIED LOCATION: ICD-10-CM

## 2025-08-06 DIAGNOSIS — Z98.890 STATUS POST PARATHYROIDECTOMY: ICD-10-CM

## 2025-08-06 DIAGNOSIS — E21.0 PRIMARY HYPERPARATHYROIDISM: ICD-10-CM

## 2025-08-06 DIAGNOSIS — E83.51 HYPOCALCEMIA: ICD-10-CM

## 2025-08-06 DIAGNOSIS — E04.2 MULTINODULAR GOITER: ICD-10-CM

## 2025-08-06 LAB
25(OH)D3 SERPL-MCNC: 54.2 NG/ML
ALBUMIN SERPL-MCNC: 4.4 G/DL (ref 3.4–5)
ALBUMIN/GLOB SERPL: 1.4 {RATIO} (ref 1.1–2.2)
ALP SERPL-CCNC: 69 U/L (ref 40–129)
ALT SERPL-CCNC: 21 U/L (ref 10–40)
ANION GAP SERPL CALCULATED.3IONS-SCNC: 11 MMOL/L (ref 3–16)
AST SERPL-CCNC: 28 U/L (ref 15–37)
BILIRUB SERPL-MCNC: 0.5 MG/DL (ref 0–1)
BUN SERPL-MCNC: 21 MG/DL (ref 7–20)
CALCIUM SERPL-MCNC: 9.2 MG/DL (ref 8.3–10.6)
CHLORIDE SERPL-SCNC: 105 MMOL/L (ref 99–110)
CO2 SERPL-SCNC: 25 MMOL/L (ref 21–32)
CREAT SERPL-MCNC: 1 MG/DL (ref 0.6–1.2)
GFR SERPLBLD CREATININE-BSD FMLA CKD-EPI: 62 ML/MIN/{1.73_M2}
GLUCOSE SERPL-MCNC: 95 MG/DL (ref 70–99)
MAGNESIUM SERPL-MCNC: 1.79 MG/DL (ref 1.8–2.4)
PHOSPHATE SERPL-MCNC: 3.8 MG/DL (ref 2.5–4.9)
POTASSIUM SERPL-SCNC: 4.5 MMOL/L (ref 3.5–5.1)
PROT SERPL-MCNC: 7.5 G/DL (ref 6.4–8.2)
SODIUM SERPL-SCNC: 141 MMOL/L (ref 136–145)
T4 FREE SERPL-MCNC: 1.2 NG/DL (ref 0.9–1.8)
TSH SERPL DL<=0.005 MIU/L-ACNC: 2.24 UIU/ML (ref 0.27–4.2)

## 2025-08-06 PROCEDURE — 84439 ASSAY OF FREE THYROXINE: CPT

## 2025-08-06 PROCEDURE — 82330 ASSAY OF CALCIUM: CPT

## 2025-08-06 PROCEDURE — 82306 VITAMIN D 25 HYDROXY: CPT

## 2025-08-06 PROCEDURE — 84443 ASSAY THYROID STIM HORMONE: CPT

## 2025-08-06 PROCEDURE — 36415 COLL VENOUS BLD VENIPUNCTURE: CPT

## 2025-08-06 PROCEDURE — 83735 ASSAY OF MAGNESIUM: CPT

## 2025-08-06 PROCEDURE — 84100 ASSAY OF PHOSPHORUS: CPT

## 2025-08-06 PROCEDURE — 80053 COMPREHEN METABOLIC PANEL: CPT

## 2025-08-08 LAB
CA-I ADJ PH7.4 SERPL-SCNC: 1.2 MMOL/L (ref 1.09–1.3)
CA-I SERPL ISE-SCNC: 1.19 MMOL/L (ref 1.09–1.3)

## 2025-08-26 DIAGNOSIS — E21.0 PRIMARY HYPERPARATHYROIDISM: ICD-10-CM

## 2025-08-26 DIAGNOSIS — E83.51 HYPOCALCEMIA: ICD-10-CM

## 2025-08-26 DIAGNOSIS — E89.2 POSTSURGICAL HYPOPARATHYROIDISM: ICD-10-CM

## 2025-08-26 DIAGNOSIS — M85.80 OSTEOPENIA, UNSPECIFIED LOCATION: ICD-10-CM

## 2025-08-26 DIAGNOSIS — Z98.890 STATUS POST PARATHYROIDECTOMY: ICD-10-CM

## 2025-08-26 DIAGNOSIS — Z90.89 STATUS POST PARATHYROIDECTOMY: ICD-10-CM

## 2025-08-26 DIAGNOSIS — E04.2 MULTINODULAR GOITER: ICD-10-CM

## 2025-08-27 RX ORDER — CALCITRIOL 0.25 UG/1
0.25 CAPSULE, LIQUID FILLED ORAL DAILY
Qty: 30 CAPSULE | Refills: 0 | Status: SHIPPED | OUTPATIENT
Start: 2025-08-27